# Patient Record
Sex: MALE | Race: BLACK OR AFRICAN AMERICAN | NOT HISPANIC OR LATINO | Employment: FULL TIME | URBAN - METROPOLITAN AREA
[De-identification: names, ages, dates, MRNs, and addresses within clinical notes are randomized per-mention and may not be internally consistent; named-entity substitution may affect disease eponyms.]

---

## 2021-04-20 ENCOUNTER — HOSPITAL ENCOUNTER (OUTPATIENT)
Dept: LAB | Facility: MEDICAL CENTER | Age: 51
End: 2021-04-20
Attending: PHYSICIAN ASSISTANT
Payer: MEDICAID

## 2021-04-20 ENCOUNTER — OFFICE VISIT (OUTPATIENT)
Dept: URGENT CARE | Facility: CLINIC | Age: 51
End: 2021-04-20
Payer: MEDICAID

## 2021-04-20 ENCOUNTER — HOSPITAL ENCOUNTER (OUTPATIENT)
Dept: RADIOLOGY | Facility: MEDICAL CENTER | Age: 51
End: 2021-04-20
Attending: PHYSICIAN ASSISTANT
Payer: MEDICAID

## 2021-04-20 VITALS
WEIGHT: 141 LBS | OXYGEN SATURATION: 95 % | HEART RATE: 75 BPM | TEMPERATURE: 97.5 F | BODY MASS INDEX: 21.44 KG/M2 | SYSTOLIC BLOOD PRESSURE: 116 MMHG | DIASTOLIC BLOOD PRESSURE: 80 MMHG | RESPIRATION RATE: 16 BRPM

## 2021-04-20 DIAGNOSIS — R10.32 LEFT LOWER QUADRANT ABDOMINAL PAIN: ICD-10-CM

## 2021-04-20 LAB
ALBUMIN SERPL BCP-MCNC: 4.5 G/DL (ref 3.2–4.9)
ALBUMIN/GLOB SERPL: 1.6 G/DL
ALP SERPL-CCNC: 73 U/L (ref 30–99)
ALT SERPL-CCNC: 12 U/L (ref 2–50)
ANION GAP SERPL CALC-SCNC: 8 MMOL/L (ref 7–16)
AST SERPL-CCNC: 16 U/L (ref 12–45)
BASOPHILS # BLD AUTO: 0.8 % (ref 0–1.8)
BASOPHILS # BLD: 0.04 K/UL (ref 0–0.12)
BILIRUB SERPL-MCNC: 1.6 MG/DL (ref 0.1–1.5)
BUN SERPL-MCNC: 9 MG/DL (ref 8–22)
CALCIUM SERPL-MCNC: 9.6 MG/DL (ref 8.5–10.5)
CHLORIDE SERPL-SCNC: 105 MMOL/L (ref 96–112)
CO2 SERPL-SCNC: 28 MMOL/L (ref 20–33)
CREAT SERPL-MCNC: 0.89 MG/DL (ref 0.5–1.4)
EOSINOPHIL # BLD AUTO: 0.13 K/UL (ref 0–0.51)
EOSINOPHIL NFR BLD: 2.5 % (ref 0–6.9)
ERYTHROCYTE [DISTWIDTH] IN BLOOD BY AUTOMATED COUNT: 46.2 FL (ref 35.9–50)
GLOBULIN SER CALC-MCNC: 2.9 G/DL (ref 1.9–3.5)
GLUCOSE SERPL-MCNC: 94 MG/DL (ref 65–99)
HCT VFR BLD AUTO: 51.7 % (ref 42–52)
HGB BLD-MCNC: 17.2 G/DL (ref 14–18)
IMM GRANULOCYTES # BLD AUTO: 0.01 K/UL (ref 0–0.11)
IMM GRANULOCYTES NFR BLD AUTO: 0.2 % (ref 0–0.9)
LIPASE SERPL-CCNC: 23 U/L (ref 11–82)
LYMPHOCYTES # BLD AUTO: 2.27 K/UL (ref 1–4.8)
LYMPHOCYTES NFR BLD: 43.5 % (ref 22–41)
MCH RBC QN AUTO: 30 PG (ref 27–33)
MCHC RBC AUTO-ENTMCNC: 33.3 G/DL (ref 33.7–35.3)
MCV RBC AUTO: 90.2 FL (ref 81.4–97.8)
MONOCYTES # BLD AUTO: 0.34 K/UL (ref 0–0.85)
MONOCYTES NFR BLD AUTO: 6.5 % (ref 0–13.4)
NEUTROPHILS # BLD AUTO: 2.43 K/UL (ref 1.82–7.42)
NEUTROPHILS NFR BLD: 46.5 % (ref 44–72)
NRBC # BLD AUTO: 0 K/UL
NRBC BLD-RTO: 0 /100 WBC
PLATELET # BLD AUTO: 179 K/UL (ref 164–446)
PMV BLD AUTO: 11.2 FL (ref 9–12.9)
POTASSIUM SERPL-SCNC: 4.2 MMOL/L (ref 3.6–5.5)
PROT SERPL-MCNC: 7.4 G/DL (ref 6–8.2)
RBC # BLD AUTO: 5.73 M/UL (ref 4.7–6.1)
SODIUM SERPL-SCNC: 141 MMOL/L (ref 135–145)
WBC # BLD AUTO: 5.2 K/UL (ref 4.8–10.8)

## 2021-04-20 PROCEDURE — 83690 ASSAY OF LIPASE: CPT

## 2021-04-20 PROCEDURE — 85025 COMPLETE CBC W/AUTO DIFF WBC: CPT

## 2021-04-20 PROCEDURE — 700117 HCHG RX CONTRAST REV CODE 255: Performed by: PHYSICIAN ASSISTANT

## 2021-04-20 PROCEDURE — 36415 COLL VENOUS BLD VENIPUNCTURE: CPT

## 2021-04-20 PROCEDURE — 80053 COMPREHEN METABOLIC PANEL: CPT

## 2021-04-20 PROCEDURE — 99204 OFFICE O/P NEW MOD 45 MIN: CPT | Performed by: PHYSICIAN ASSISTANT

## 2021-04-20 PROCEDURE — 74177 CT ABD & PELVIS W/CONTRAST: CPT

## 2021-04-20 RX ADMIN — IOHEXOL 25 ML: 240 INJECTION, SOLUTION INTRATHECAL; INTRAVASCULAR; INTRAVENOUS; ORAL at 15:08

## 2021-04-20 RX ADMIN — IOHEXOL 100 ML: 350 INJECTION, SOLUTION INTRAVENOUS at 16:24

## 2021-04-21 ASSESSMENT — ENCOUNTER SYMPTOMS
ABDOMINAL PAIN: 1
SHORTNESS OF BREATH: 0
CONSTIPATION: 0
DIARRHEA: 0
BLOOD IN STOOL: 0
MUSCULOSKELETAL NEGATIVE: 1
FEVER: 0
VOMITING: 0
FLATUS: 0
CHILLS: 0
NAUSEA: 0
DIZZINESS: 0
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
## 2022-08-26 ENCOUNTER — HOSPITAL ENCOUNTER (EMERGENCY)
Facility: MEDICAL CENTER | Age: 52
End: 2022-08-26
Attending: STUDENT IN AN ORGANIZED HEALTH CARE EDUCATION/TRAINING PROGRAM
Payer: MEDICAID

## 2022-08-26 ENCOUNTER — APPOINTMENT (OUTPATIENT)
Dept: RADIOLOGY | Facility: MEDICAL CENTER | Age: 52
End: 2022-08-26
Attending: STUDENT IN AN ORGANIZED HEALTH CARE EDUCATION/TRAINING PROGRAM
Payer: MEDICAID

## 2022-08-26 VITALS
BODY MASS INDEX: 21.78 KG/M2 | TEMPERATURE: 98.1 F | OXYGEN SATURATION: 98 % | HEART RATE: 61 BPM | HEIGHT: 68 IN | RESPIRATION RATE: 18 BRPM | SYSTOLIC BLOOD PRESSURE: 116 MMHG | WEIGHT: 143.74 LBS | DIASTOLIC BLOOD PRESSURE: 75 MMHG

## 2022-08-26 DIAGNOSIS — M75.01 ADHESIVE CAPSULITIS OF RIGHT SHOULDER: ICD-10-CM

## 2022-08-26 DIAGNOSIS — M25.511 CHRONIC RIGHT SHOULDER PAIN: ICD-10-CM

## 2022-08-26 DIAGNOSIS — G89.29 CHRONIC RIGHT SHOULDER PAIN: ICD-10-CM

## 2022-08-26 LAB — EKG IMPRESSION: NORMAL

## 2022-08-26 PROCEDURE — 71045 X-RAY EXAM CHEST 1 VIEW: CPT

## 2022-08-26 PROCEDURE — 700102 HCHG RX REV CODE 250 W/ 637 OVERRIDE(OP): Performed by: STUDENT IN AN ORGANIZED HEALTH CARE EDUCATION/TRAINING PROGRAM

## 2022-08-26 PROCEDURE — 93005 ELECTROCARDIOGRAM TRACING: CPT | Performed by: STUDENT IN AN ORGANIZED HEALTH CARE EDUCATION/TRAINING PROGRAM

## 2022-08-26 PROCEDURE — A9270 NON-COVERED ITEM OR SERVICE: HCPCS | Performed by: STUDENT IN AN ORGANIZED HEALTH CARE EDUCATION/TRAINING PROGRAM

## 2022-08-26 PROCEDURE — 99283 EMERGENCY DEPT VISIT LOW MDM: CPT

## 2022-08-26 PROCEDURE — 73030 X-RAY EXAM OF SHOULDER: CPT | Mod: RT

## 2022-08-26 RX ORDER — METHOCARBAMOL 750 MG/1
750 TABLET, FILM COATED ORAL 4 TIMES DAILY
Qty: 120 TABLET | Refills: 0 | Status: SHIPPED | OUTPATIENT
Start: 2022-08-26 | End: 2023-04-02

## 2022-08-26 RX ORDER — METHOCARBAMOL 500 MG/1
500 TABLET, FILM COATED ORAL ONCE
Status: COMPLETED | OUTPATIENT
Start: 2022-08-26 | End: 2022-08-26

## 2022-08-26 RX ADMIN — METHOCARBAMOL 500 MG: 500 TABLET ORAL at 02:38

## 2022-08-26 ASSESSMENT — FIBROSIS 4 INDEX: FIB4 SCORE: 1.29

## 2022-08-26 NOTE — DISCHARGE INSTRUCTIONS
It is very important that you follow-up with your primary doctor in order to further evaluate your injury with an MRI if needed.  Also very important that you follow through with physical therapy as previously scheduled.  Please return if you develop any worsening pain, numbness or tingling will have any new or acute concern.    For pain, take Ibuprofen (Motrin, Advil) 600 mg up to every six hours  mg up to every eight hours if your stomach can take it. Instead of Ibuprofen, you can choose Naproxen (Aleve) and take 250-500 mg twice daily. Take Ibuprofen or Naproxen with food to lessen stomach irritation. You may also take Acetaminophen (Tylenol) 500mg (may take up to 1gm) every six hours in addition to Ibuprofen or Naproxen. (Maximum Tylenol 4 gm/day).  I suggest alternating between the ibuprofen and Tylenol every 4 hours for optimal pain relief and adequate spacing of each medication.     Please do not drink, drive or operate any heavy machinery with the muscle relaxant prescribed.

## 2022-08-26 NOTE — ED TRIAGE NOTES
"Chief Complaint   Patient presents with    Shoulder Pain     Pt fell and hit right side of body on metal railing 3 months ago. Seen at [facility] previously and given PT orders. Pt states he missed PT appointment. Complaining of right shoulder pain radiating to upper chest and down arm. Pt has decreased ROM in right arm, unable to lift arm above waist.        53 yo M to triage for above complaint. XRAY ordered.     Pt placed in lobby and educated on triage process.     /87   Pulse 78   Temp 36.7 °C (98.1 °F) (Temporal)   Resp 18   Ht 1.727 m (5' 8\")   Wt 65.2 kg (143 lb 11.8 oz)   SpO2 99%   BMI 21.86 kg/m²     "

## 2022-08-26 NOTE — ED PROVIDER NOTES
ED Provider Note    CHIEF COMPLAINT  Chief Complaint   Patient presents with    Shoulder Pain     Pt fell and hit right side of body on metal railing 3 months ago. Seen at [facility] previously and given PT orders. Pt states he missed PT appointment. Complaining of right shoulder pain radiating to upper chest and down arm. Pt has decreased ROM in right arm, unable to lift arm above waist.        HPI  Tariq Rivera is a 52 y.o. male who presents for right shoulder pain.  Patient states that he fell about 3 months ago onto the right side hitting a metal body.  He was seen at [facility] and told he had a negative x-ray.  He says that he has developed worsening pain since then it is constant and radiates through the chest and down the arm.  Has he has difficulty with range of motion.  No numbness or tingling.  He states pain is constant, severe, not relieved with ibuprofen or Tylenol.  He denies any repeat injury or traumatic injury.    REVIEW OF SYSTEMS  As per \[facility]\"", otherwise a 5 point review of systems is negative    PAST MEDICAL HISTORY  Past Medical History:   Diagnosis Date    Asthma     Hemorrhoids, external 11/11/2013       SOCIAL HISTORY  Social History     Tobacco Use    Smoking status: Every Day     Packs/day: 0.50     Types: Cigarettes    Smokeless tobacco: Never   Vaping Use    Vaping Use: Never used   Substance Use Topics    Alcohol use: Yes    Drug use: Yes     Types: Inhaled     Comment: marijuana       SURGICAL HISTORY  Past Surgical History:   Procedure Laterality Date    ARCH BAR APPLICATION N/A 9/6/2015    Procedure: ARCH BAR APPLICATION;  Surgeon: Yashira Gordon M.D.;  Location: SURGERY [facility];  Service:     LACERATION REPAIR N/A 9/6/2015    Procedure: LACERATION REPAIR;  Surgeon: Yashira Gordon M.D.;  Location: SURGERY [facility];  Service:        ALLERGIES  Allergies   Allergen Reactions    Nkda [No Known Drug Allergy]        PHYSICAL EXAM  VITAL SIGNS: /75   Pulse 61    "Temp 36.7 °C (98.1 °F) (Temporal)   Resp 18   Ht 1.727 m (5' 8\")   Wt 65.2 kg (143 lb 11.8 oz)   SpO2 98%   BMI 21.86 kg/m²    Constitutional: Awake and alert. Nontoxic  HENT:  Grossly normal  Eyes: Grossly normal  Neck: Normal range of motion  Cardiovascular: Normal heart rate   Thorax & Lungs: No respiratory distress  Abdomen: Nontender  Skin:  No pathologic rash.   Extremities:   Shoulder Exam  No obvious deformity.  No erythema, edema, ecchymosis, or broken skin. Limited ROM, unable to abduct past 90 degrees.  No point tenderness of clavicle, glenohumeral joint line or AC joint. 2+ radial pulses bilaterally. Sensation intact in axillary nerve distribution and radial, median and ulnar nerve distributions distally. 5/5 strength with shoulder abduction, elbow flexion/extension, wrist flexion/extension.   Psychiatric: Affect normal    RADIOLOGY/PROCEDURES  Imaging is interpreted by radiologist    Labs:  Laboratory data ordered and reviewed, please see chart    EKG  Rate: Normal  Rhythm: Normal  Axis:Normal  Intervals:Normal  ST changes: None  Interpretation:'Normal EKG    COURSE & MEDICAL DECISION MAKING      This is a 52-year-old with a remote right shoulder injury who presents with persistent right shoulder pain.  X-ray does not reveal any acute fracture.  Given his complaints of radiation to the chest I obtained a chest x-ray and an EKG in an abundance of caution.  He did not have any acute cardiopulmonary abnormalities including no rib fractures, pneumothorax or pneumonia.  EKG is all additionally normal and he has no other signs to suggest ACS, myocarditis or pericarditis.  He does have significant limitation in range of motion of his affected shoulder and I am worried he has developed a frozen shoulder.  I stressed the importance of outpatient follow-up, physical therapy and he is agreeable.  He was given methocarbamol in the ER and prescription sent to his preferred pharmacy.        FINAL IMPRESSION  1. " Chronic right shoulder pain  methocarbamol (ROBAXIN) 750 MG Tab      2. Adhesive capsulitis of right shoulder Acute               Disposition: home in good condition      This dictation was created using voice recognition software. The accuracy of the dictation is limited to the abilities of the software.  The nursing notes were reviewed and certain aspects of this information were incorporated into this note.      Electronically signed by: Rhea Watson M.D., 8/26/2022 2:27 AM

## 2023-04-02 ENCOUNTER — APPOINTMENT (OUTPATIENT)
Dept: RADIOLOGY | Facility: MEDICAL CENTER | Age: 53
DRG: 337 | End: 2023-04-02
Attending: EMERGENCY MEDICINE
Payer: MEDICAID

## 2023-04-02 ENCOUNTER — HOSPITAL ENCOUNTER (INPATIENT)
Facility: MEDICAL CENTER | Age: 53
LOS: 4 days | DRG: 337 | End: 2023-04-06
Attending: EMERGENCY MEDICINE | Admitting: SURGERY
Payer: MEDICAID

## 2023-04-02 ENCOUNTER — ANESTHESIA (OUTPATIENT)
Dept: SURGERY | Facility: MEDICAL CENTER | Age: 53
DRG: 337 | End: 2023-04-02
Payer: MEDICAID

## 2023-04-02 ENCOUNTER — ANESTHESIA EVENT (OUTPATIENT)
Dept: SURGERY | Facility: MEDICAL CENTER | Age: 53
DRG: 337 | End: 2023-04-02
Payer: MEDICAID

## 2023-04-02 DIAGNOSIS — K56.609 SMALL BOWEL OBSTRUCTION (HCC): ICD-10-CM

## 2023-04-02 LAB
ALBUMIN SERPL BCP-MCNC: 4.3 G/DL (ref 3.2–4.9)
ALBUMIN/GLOB SERPL: 1.3 G/DL
ALP SERPL-CCNC: 75 U/L (ref 30–99)
ALT SERPL-CCNC: 11 U/L (ref 2–50)
AMPHET UR QL SCN: NEGATIVE
ANION GAP SERPL CALC-SCNC: 13 MMOL/L (ref 7–16)
APPEARANCE UR: CLEAR
AST SERPL-CCNC: 20 U/L (ref 12–45)
BARBITURATES UR QL SCN: NEGATIVE
BASOPHILS # BLD AUTO: 0.5 % (ref 0–1.8)
BASOPHILS # BLD: 0.04 K/UL (ref 0–0.12)
BENZODIAZ UR QL SCN: NEGATIVE
BILIRUB SERPL-MCNC: 0.8 MG/DL (ref 0.1–1.5)
BILIRUB UR QL STRIP.AUTO: NEGATIVE
BUN SERPL-MCNC: 8 MG/DL (ref 8–22)
BZE UR QL SCN: NEGATIVE
CALCIUM ALBUM COR SERPL-MCNC: 9 MG/DL (ref 8.5–10.5)
CALCIUM SERPL-MCNC: 9.2 MG/DL (ref 8.5–10.5)
CANNABINOIDS UR QL SCN: POSITIVE
CHLORIDE SERPL-SCNC: 108 MMOL/L (ref 96–112)
CO2 SERPL-SCNC: 21 MMOL/L (ref 20–33)
COLOR UR: YELLOW
CREAT SERPL-MCNC: 0.85 MG/DL (ref 0.5–1.4)
EOSINOPHIL # BLD AUTO: 0.15 K/UL (ref 0–0.51)
EOSINOPHIL NFR BLD: 1.9 % (ref 0–6.9)
ERYTHROCYTE [DISTWIDTH] IN BLOOD BY AUTOMATED COUNT: 43 FL (ref 35.9–50)
GFR SERPLBLD CREATININE-BSD FMLA CKD-EPI: 105 ML/MIN/1.73 M 2
GLOBULIN SER CALC-MCNC: 3.2 G/DL (ref 1.9–3.5)
GLUCOSE SERPL-MCNC: 89 MG/DL (ref 65–99)
GLUCOSE UR STRIP.AUTO-MCNC: NEGATIVE MG/DL
HCT VFR BLD AUTO: 47.9 % (ref 42–52)
HGB BLD-MCNC: 16.4 G/DL (ref 14–18)
IMM GRANULOCYTES # BLD AUTO: 0.02 K/UL (ref 0–0.11)
IMM GRANULOCYTES NFR BLD AUTO: 0.3 % (ref 0–0.9)
KETONES UR STRIP.AUTO-MCNC: NEGATIVE MG/DL
LEUKOCYTE ESTERASE UR QL STRIP.AUTO: NEGATIVE
LIPASE SERPL-CCNC: 20 U/L (ref 11–82)
LYMPHOCYTES # BLD AUTO: 2.24 K/UL (ref 1–4.8)
LYMPHOCYTES NFR BLD: 28.4 % (ref 22–41)
MCH RBC QN AUTO: 29.4 PG (ref 27–33)
MCHC RBC AUTO-ENTMCNC: 34.2 G/DL (ref 33.7–35.3)
MCV RBC AUTO: 86 FL (ref 81.4–97.8)
METHADONE UR QL SCN: NEGATIVE
MICRO URNS: NORMAL
MONOCYTES # BLD AUTO: 0.4 K/UL (ref 0–0.85)
MONOCYTES NFR BLD AUTO: 5.1 % (ref 0–13.4)
NEUTROPHILS # BLD AUTO: 5.04 K/UL (ref 1.82–7.42)
NEUTROPHILS NFR BLD: 63.8 % (ref 44–72)
NITRITE UR QL STRIP.AUTO: NEGATIVE
NRBC # BLD AUTO: 0 K/UL
NRBC BLD-RTO: 0 /100 WBC
OPIATES UR QL SCN: POSITIVE
OXYCODONE UR QL SCN: NEGATIVE
PCP UR QL SCN: NEGATIVE
PH UR STRIP.AUTO: 8 [PH] (ref 5–8)
PLATELET # BLD AUTO: 203 K/UL (ref 164–446)
PMV BLD AUTO: 11.2 FL (ref 9–12.9)
POTASSIUM SERPL-SCNC: 3.8 MMOL/L (ref 3.6–5.5)
PROPOXYPH UR QL SCN: NEGATIVE
PROT SERPL-MCNC: 7.5 G/DL (ref 6–8.2)
PROT UR QL STRIP: NEGATIVE MG/DL
RBC # BLD AUTO: 5.57 M/UL (ref 4.7–6.1)
RBC UR QL AUTO: NEGATIVE
SODIUM SERPL-SCNC: 142 MMOL/L (ref 135–145)
SP GR UR STRIP.AUTO: 1.04
UROBILINOGEN UR STRIP.AUTO-MCNC: 1 MG/DL
WBC # BLD AUTO: 7.9 K/UL (ref 4.8–10.8)

## 2023-04-02 PROCEDURE — 96375 TX/PRO/DX INJ NEW DRUG ADDON: CPT

## 2023-04-02 PROCEDURE — 3E0T3BZ INTRODUCTION OF ANESTHETIC AGENT INTO PERIPHERAL NERVES AND PLEXI, PERCUTANEOUS APPROACH: ICD-10-PCS | Performed by: ANESTHESIOLOGY

## 2023-04-02 PROCEDURE — 64488 TAP BLOCK BI INJECTION: CPT | Performed by: SURGERY

## 2023-04-02 PROCEDURE — 80053 COMPREHEN METABOLIC PANEL: CPT

## 2023-04-02 PROCEDURE — 160002 HCHG RECOVERY MINUTES (STAT): Performed by: SURGERY

## 2023-04-02 PROCEDURE — 700111 HCHG RX REV CODE 636 W/ 250 OVERRIDE (IP): Performed by: ANESTHESIOLOGY

## 2023-04-02 PROCEDURE — 83690 ASSAY OF LIPASE: CPT

## 2023-04-02 PROCEDURE — 700105 HCHG RX REV CODE 258: Performed by: ANESTHESIOLOGY

## 2023-04-02 PROCEDURE — 85025 COMPLETE CBC W/AUTO DIFF WBC: CPT

## 2023-04-02 PROCEDURE — 99291 CRITICAL CARE FIRST HOUR: CPT

## 2023-04-02 PROCEDURE — 160048 HCHG OR STATISTICAL LEVEL 1-5: Performed by: SURGERY

## 2023-04-02 PROCEDURE — 74177 CT ABD & PELVIS W/CONTRAST: CPT

## 2023-04-02 PROCEDURE — 64488 TAP BLOCK BI INJECTION: CPT | Mod: 59 | Performed by: ANESTHESIOLOGY

## 2023-04-02 PROCEDURE — 96374 THER/PROPH/DIAG INJ IV PUSH: CPT

## 2023-04-02 PROCEDURE — 44005 FREEING OF BOWEL ADHESION: CPT | Mod: AS | Performed by: PHYSICIAN ASSISTANT

## 2023-04-02 PROCEDURE — 160042 HCHG SURGERY MINUTES - EA ADDL 1 MIN LEVEL 5: Performed by: SURGERY

## 2023-04-02 PROCEDURE — 700101 HCHG RX REV CODE 250

## 2023-04-02 PROCEDURE — 700101 HCHG RX REV CODE 250: Performed by: ANESTHESIOLOGY

## 2023-04-02 PROCEDURE — 700105 HCHG RX REV CODE 258: Performed by: SURGERY

## 2023-04-02 PROCEDURE — 00790 ANES IPER UPR ABD NOS: CPT | Performed by: ANESTHESIOLOGY

## 2023-04-02 PROCEDURE — 160031 HCHG SURGERY MINUTES - 1ST 30 MINS LEVEL 5: Performed by: SURGERY

## 2023-04-02 PROCEDURE — 70450 CT HEAD/BRAIN W/O DYE: CPT

## 2023-04-02 PROCEDURE — 80307 DRUG TEST PRSMV CHEM ANLYZR: CPT

## 2023-04-02 PROCEDURE — 770001 HCHG ROOM/CARE - MED/SURG/GYN PRIV*

## 2023-04-02 PROCEDURE — 160035 HCHG PACU - 1ST 60 MINS PHASE I: Performed by: SURGERY

## 2023-04-02 PROCEDURE — 99223 1ST HOSP IP/OBS HIGH 75: CPT | Mod: 57 | Performed by: SURGERY

## 2023-04-02 PROCEDURE — 700105 HCHG RX REV CODE 258: Performed by: EMERGENCY MEDICINE

## 2023-04-02 PROCEDURE — 81003 URINALYSIS AUTO W/O SCOPE: CPT

## 2023-04-02 PROCEDURE — 700111 HCHG RX REV CODE 636 W/ 250 OVERRIDE (IP): Performed by: EMERGENCY MEDICINE

## 2023-04-02 PROCEDURE — 160036 HCHG PACU - EA ADDL 30 MINS PHASE I: Performed by: SURGERY

## 2023-04-02 PROCEDURE — 0DNW0ZZ RELEASE PERITONEUM, OPEN APPROACH: ICD-10-PCS | Performed by: SURGERY

## 2023-04-02 PROCEDURE — 160009 HCHG ANES TIME/MIN: Performed by: SURGERY

## 2023-04-02 PROCEDURE — 36415 COLL VENOUS BLD VENIPUNCTURE: CPT

## 2023-04-02 PROCEDURE — 700101 HCHG RX REV CODE 250: Performed by: EMERGENCY MEDICINE

## 2023-04-02 PROCEDURE — 700117 HCHG RX CONTRAST REV CODE 255: Performed by: EMERGENCY MEDICINE

## 2023-04-02 PROCEDURE — 700111 HCHG RX REV CODE 636 W/ 250 OVERRIDE (IP): Performed by: SURGERY

## 2023-04-02 PROCEDURE — 44005 FREEING OF BOWEL ADHESION: CPT | Performed by: SURGERY

## 2023-04-02 RX ORDER — SODIUM CHLORIDE, SODIUM LACTATE, POTASSIUM CHLORIDE, CALCIUM CHLORIDE 600; 310; 30; 20 MG/100ML; MG/100ML; MG/100ML; MG/100ML
INJECTION, SOLUTION INTRAVENOUS CONTINUOUS
Status: DISCONTINUED | OUTPATIENT
Start: 2023-04-02 | End: 2023-04-04

## 2023-04-02 RX ORDER — DIPHENHYDRAMINE HYDROCHLORIDE 50 MG/ML
12.5 INJECTION INTRAMUSCULAR; INTRAVENOUS
Status: DISCONTINUED | OUTPATIENT
Start: 2023-04-02 | End: 2023-04-02 | Stop reason: HOSPADM

## 2023-04-02 RX ORDER — BUPIVACAINE HYDROCHLORIDE 2.5 MG/ML
INJECTION, SOLUTION EPIDURAL; INFILTRATION; INTRACAUDAL
Status: COMPLETED | OUTPATIENT
Start: 2023-04-02 | End: 2023-04-02

## 2023-04-02 RX ORDER — HYDROMORPHONE HYDROCHLORIDE 1 MG/ML
1 INJECTION, SOLUTION INTRAMUSCULAR; INTRAVENOUS; SUBCUTANEOUS ONCE
Status: COMPLETED | OUTPATIENT
Start: 2023-04-02 | End: 2023-04-02

## 2023-04-02 RX ORDER — METOPROLOL TARTRATE 1 MG/ML
1 INJECTION, SOLUTION INTRAVENOUS
Status: DISCONTINUED | OUTPATIENT
Start: 2023-04-02 | End: 2023-04-02 | Stop reason: HOSPADM

## 2023-04-02 RX ORDER — METRONIDAZOLE 500 MG/100ML
500 INJECTION, SOLUTION INTRAVENOUS ONCE
Status: COMPLETED | OUTPATIENT
Start: 2023-04-02 | End: 2023-04-02

## 2023-04-02 RX ORDER — CEFTRIAXONE 1 G/1
1000 INJECTION, POWDER, FOR SOLUTION INTRAMUSCULAR; INTRAVENOUS ONCE
Status: COMPLETED | OUTPATIENT
Start: 2023-04-02 | End: 2023-04-02

## 2023-04-02 RX ORDER — HYDROMORPHONE HYDROCHLORIDE 1 MG/ML
0.4 INJECTION, SOLUTION INTRAMUSCULAR; INTRAVENOUS; SUBCUTANEOUS
Status: DISCONTINUED | OUTPATIENT
Start: 2023-04-02 | End: 2023-04-02 | Stop reason: HOSPADM

## 2023-04-02 RX ORDER — SODIUM CHLORIDE 9 MG/ML
1000 INJECTION, SOLUTION INTRAVENOUS ONCE
Status: COMPLETED | OUTPATIENT
Start: 2023-04-02 | End: 2023-04-03

## 2023-04-02 RX ORDER — DEXAMETHASONE SODIUM PHOSPHATE 4 MG/ML
INJECTION, SOLUTION INTRA-ARTICULAR; INTRALESIONAL; INTRAMUSCULAR; INTRAVENOUS; SOFT TISSUE PRN
Status: DISCONTINUED | OUTPATIENT
Start: 2023-04-02 | End: 2023-04-02 | Stop reason: SURG

## 2023-04-02 RX ORDER — LORAZEPAM 2 MG/ML
2 INJECTION INTRAMUSCULAR ONCE
Status: DISPENSED | OUTPATIENT
Start: 2023-04-02 | End: 2023-04-03

## 2023-04-02 RX ORDER — EPHEDRINE SULFATE 50 MG/ML
5 INJECTION, SOLUTION INTRAVENOUS
Status: DISCONTINUED | OUTPATIENT
Start: 2023-04-02 | End: 2023-04-02 | Stop reason: HOSPADM

## 2023-04-02 RX ORDER — OXYCODONE HCL 5 MG/5 ML
10 SOLUTION, ORAL ORAL
Status: DISCONTINUED | OUTPATIENT
Start: 2023-04-02 | End: 2023-04-02 | Stop reason: HOSPADM

## 2023-04-02 RX ORDER — HYDROMORPHONE HYDROCHLORIDE 2 MG/ML
INJECTION, SOLUTION INTRAMUSCULAR; INTRAVENOUS; SUBCUTANEOUS PRN
Status: DISCONTINUED | OUTPATIENT
Start: 2023-04-02 | End: 2023-04-02 | Stop reason: SURG

## 2023-04-02 RX ORDER — MIDAZOLAM HYDROCHLORIDE 1 MG/ML
INJECTION INTRAMUSCULAR; INTRAVENOUS PRN
Status: DISCONTINUED | OUTPATIENT
Start: 2023-04-02 | End: 2023-04-02 | Stop reason: SURG

## 2023-04-02 RX ORDER — ACETAMINOPHEN 500 MG
1000 TABLET ORAL EVERY 4 HOURS PRN
Status: DISCONTINUED | OUTPATIENT
Start: 2023-04-02 | End: 2023-04-02 | Stop reason: HOSPADM

## 2023-04-02 RX ORDER — HYDROMORPHONE HYDROCHLORIDE 1 MG/ML
0.2 INJECTION, SOLUTION INTRAMUSCULAR; INTRAVENOUS; SUBCUTANEOUS
Status: DISCONTINUED | OUTPATIENT
Start: 2023-04-02 | End: 2023-04-02 | Stop reason: HOSPADM

## 2023-04-02 RX ORDER — ONDANSETRON 2 MG/ML
INJECTION INTRAMUSCULAR; INTRAVENOUS PRN
Status: DISCONTINUED | OUTPATIENT
Start: 2023-04-02 | End: 2023-04-02 | Stop reason: SURG

## 2023-04-02 RX ORDER — ONDANSETRON 2 MG/ML
4 INJECTION INTRAMUSCULAR; INTRAVENOUS EVERY 4 HOURS PRN
Status: DISCONTINUED | OUTPATIENT
Start: 2023-04-02 | End: 2023-04-06 | Stop reason: HOSPADM

## 2023-04-02 RX ORDER — ONDANSETRON 2 MG/ML
4 INJECTION INTRAMUSCULAR; INTRAVENOUS ONCE
Status: COMPLETED | OUTPATIENT
Start: 2023-04-02 | End: 2023-04-02

## 2023-04-02 RX ORDER — HALOPERIDOL 5 MG/ML
1 INJECTION INTRAMUSCULAR
Status: DISCONTINUED | OUTPATIENT
Start: 2023-04-02 | End: 2023-04-02 | Stop reason: HOSPADM

## 2023-04-02 RX ORDER — ALBUTEROL SULFATE 2.5 MG/3ML
2.5 SOLUTION RESPIRATORY (INHALATION)
Status: DISCONTINUED | OUTPATIENT
Start: 2023-04-02 | End: 2023-04-02 | Stop reason: HOSPADM

## 2023-04-02 RX ORDER — ROCURONIUM BROMIDE 10 MG/ML
INJECTION, SOLUTION INTRAVENOUS PRN
Status: DISCONTINUED | OUTPATIENT
Start: 2023-04-02 | End: 2023-04-02 | Stop reason: HOSPADM

## 2023-04-02 RX ORDER — KETOROLAC TROMETHAMINE 30 MG/ML
INJECTION, SOLUTION INTRAMUSCULAR; INTRAVENOUS PRN
Status: DISCONTINUED | OUTPATIENT
Start: 2023-04-02 | End: 2023-04-02 | Stop reason: SURG

## 2023-04-02 RX ORDER — MORPHINE SULFATE 4 MG/ML
4 INJECTION INTRAVENOUS ONCE
Status: COMPLETED | OUTPATIENT
Start: 2023-04-02 | End: 2023-04-02

## 2023-04-02 RX ORDER — ONDANSETRON 2 MG/ML
4 INJECTION INTRAMUSCULAR; INTRAVENOUS
Status: DISCONTINUED | OUTPATIENT
Start: 2023-04-02 | End: 2023-04-02 | Stop reason: HOSPADM

## 2023-04-02 RX ORDER — HYDROMORPHONE HYDROCHLORIDE 1 MG/ML
0.1 INJECTION, SOLUTION INTRAMUSCULAR; INTRAVENOUS; SUBCUTANEOUS
Status: DISCONTINUED | OUTPATIENT
Start: 2023-04-02 | End: 2023-04-02 | Stop reason: HOSPADM

## 2023-04-02 RX ORDER — SODIUM CHLORIDE, SODIUM LACTATE, POTASSIUM CHLORIDE, CALCIUM CHLORIDE 600; 310; 30; 20 MG/100ML; MG/100ML; MG/100ML; MG/100ML
INJECTION, SOLUTION INTRAVENOUS CONTINUOUS
Status: DISCONTINUED | OUTPATIENT
Start: 2023-04-02 | End: 2023-04-02 | Stop reason: HOSPADM

## 2023-04-02 RX ORDER — SODIUM CHLORIDE, SODIUM LACTATE, POTASSIUM CHLORIDE, CALCIUM CHLORIDE 600; 310; 30; 20 MG/100ML; MG/100ML; MG/100ML; MG/100ML
INJECTION, SOLUTION INTRAVENOUS
Status: DISCONTINUED | OUTPATIENT
Start: 2023-04-02 | End: 2023-04-02 | Stop reason: SURG

## 2023-04-02 RX ORDER — LABETALOL HYDROCHLORIDE 5 MG/ML
5 INJECTION, SOLUTION INTRAVENOUS
Status: DISCONTINUED | OUTPATIENT
Start: 2023-04-02 | End: 2023-04-02 | Stop reason: HOSPADM

## 2023-04-02 RX ORDER — OXYCODONE HCL 5 MG/5 ML
5 SOLUTION, ORAL ORAL
Status: DISCONTINUED | OUTPATIENT
Start: 2023-04-02 | End: 2023-04-02 | Stop reason: HOSPADM

## 2023-04-02 RX ORDER — LORAZEPAM 2 MG/ML
0.5 INJECTION INTRAMUSCULAR ONCE
Status: DISCONTINUED | OUTPATIENT
Start: 2023-04-02 | End: 2023-04-02 | Stop reason: SDUPTHER

## 2023-04-02 RX ORDER — MEPERIDINE HYDROCHLORIDE 25 MG/ML
12.5 INJECTION INTRAMUSCULAR; INTRAVENOUS; SUBCUTANEOUS
Status: DISCONTINUED | OUTPATIENT
Start: 2023-04-02 | End: 2023-04-02 | Stop reason: HOSPADM

## 2023-04-02 RX ORDER — LABETALOL HYDROCHLORIDE 5 MG/ML
INJECTION, SOLUTION INTRAVENOUS PRN
Status: DISCONTINUED | OUTPATIENT
Start: 2023-04-02 | End: 2023-04-02 | Stop reason: SURG

## 2023-04-02 RX ORDER — ONDANSETRON 4 MG/1
4 TABLET, ORALLY DISINTEGRATING ORAL EVERY 4 HOURS PRN
Status: DISCONTINUED | OUTPATIENT
Start: 2023-04-02 | End: 2023-04-06 | Stop reason: HOSPADM

## 2023-04-02 RX ORDER — CEFOTETAN DISODIUM 2 G/20ML
INJECTION, POWDER, FOR SOLUTION INTRAMUSCULAR; INTRAVENOUS PRN
Status: DISCONTINUED | OUTPATIENT
Start: 2023-04-02 | End: 2023-04-02 | Stop reason: HOSPADM

## 2023-04-02 RX ORDER — MIDAZOLAM HYDROCHLORIDE 1 MG/ML
1 INJECTION INTRAMUSCULAR; INTRAVENOUS
Status: DISCONTINUED | OUTPATIENT
Start: 2023-04-02 | End: 2023-04-02 | Stop reason: HOSPADM

## 2023-04-02 RX ORDER — ALBUTEROL SULFATE 2.5 MG/3ML
SOLUTION RESPIRATORY (INHALATION)
Status: COMPLETED
Start: 2023-04-02 | End: 2023-04-02

## 2023-04-02 RX ADMIN — MORPHINE SULFATE 4 MG: 4 INJECTION INTRAVENOUS at 08:06

## 2023-04-02 RX ADMIN — PROPOFOL 50 MG: 10 INJECTION, EMULSION INTRAVENOUS at 14:16

## 2023-04-02 RX ADMIN — SODIUM CHLORIDE, POTASSIUM CHLORIDE, SODIUM LACTATE AND CALCIUM CHLORIDE: 600; 310; 30; 20 INJECTION, SOLUTION INTRAVENOUS at 20:46

## 2023-04-02 RX ADMIN — MIDAZOLAM HYDROCHLORIDE 2 MG: 1 INJECTION, SOLUTION INTRAMUSCULAR; INTRAVENOUS at 14:08

## 2023-04-02 RX ADMIN — ROCURONIUM BROMIDE 40 MG: 10 INJECTION, SOLUTION INTRAVENOUS at 14:13

## 2023-04-02 RX ADMIN — PROPOFOL 150 MG: 10 INJECTION, EMULSION INTRAVENOUS at 14:13

## 2023-04-02 RX ADMIN — MEPERIDINE HYDROCHLORIDE 12.5 MG: 25 INJECTION INTRAMUSCULAR; INTRAVENOUS; SUBCUTANEOUS at 15:33

## 2023-04-02 RX ADMIN — BUPIVACAINE HYDROCHLORIDE 25 ML: 2.5 INJECTION, SOLUTION EPIDURAL; INFILTRATION; INTRACAUDAL; PERINEURAL at 13:37

## 2023-04-02 RX ADMIN — Medication: at 20:47

## 2023-04-02 RX ADMIN — CEFTRIAXONE SODIUM 1000 MG: 1 INJECTION, POWDER, FOR SOLUTION INTRAMUSCULAR; INTRAVENOUS at 12:40

## 2023-04-02 RX ADMIN — METRONIDAZOLE 500 MG: 500 INJECTION, SOLUTION INTRAVENOUS at 12:30

## 2023-04-02 RX ADMIN — KETOROLAC TROMETHAMINE 30 MG: 30 INJECTION, SOLUTION INTRAMUSCULAR at 15:08

## 2023-04-02 RX ADMIN — HYDROMORPHONE HYDROCHLORIDE 1 MG: 1 INJECTION, SOLUTION INTRAMUSCULAR; INTRAVENOUS; SUBCUTANEOUS at 12:37

## 2023-04-02 RX ADMIN — HYDROMORPHONE HYDROCHLORIDE 1 MG: 1 INJECTION, SOLUTION INTRAMUSCULAR; INTRAVENOUS; SUBCUTANEOUS at 09:34

## 2023-04-02 RX ADMIN — ONDANSETRON 4 MG: 2 INJECTION INTRAMUSCULAR; INTRAVENOUS at 14:49

## 2023-04-02 RX ADMIN — HYDROMORPHONE HYDROCHLORIDE 2 MG: 2 INJECTION INTRAMUSCULAR; INTRAVENOUS; SUBCUTANEOUS at 14:13

## 2023-04-02 RX ADMIN — SODIUM CHLORIDE 1000 ML: 9 INJECTION, SOLUTION INTRAVENOUS at 09:34

## 2023-04-02 RX ADMIN — ALBUTEROL SULFATE 2.5 MG: 2.5 SOLUTION RESPIRATORY (INHALATION) at 15:56

## 2023-04-02 RX ADMIN — LABETALOL HYDROCHLORIDE 10 MG: 5 INJECTION, SOLUTION INTRAVENOUS at 14:39

## 2023-04-02 RX ADMIN — ONDANSETRON HYDROCHLORIDE 4 MG: 2 SOLUTION INTRAMUSCULAR; INTRAVENOUS at 08:06

## 2023-04-02 RX ADMIN — IOHEXOL 80 ML: 350 INJECTION, SOLUTION INTRAVENOUS at 09:30

## 2023-04-02 RX ADMIN — SODIUM CHLORIDE, POTASSIUM CHLORIDE, SODIUM LACTATE AND CALCIUM CHLORIDE: 600; 310; 30; 20 INJECTION, SOLUTION INTRAVENOUS at 14:03

## 2023-04-02 RX ADMIN — CEFOTETAN DISODIUM 2 G: 2 INJECTION, POWDER, FOR SOLUTION INTRAMUSCULAR; INTRAVENOUS at 14:24

## 2023-04-02 RX ADMIN — MEPERIDINE HYDROCHLORIDE 12.5 MG: 25 INJECTION INTRAMUSCULAR; INTRAVENOUS; SUBCUTANEOUS at 15:40

## 2023-04-02 RX ADMIN — DEXAMETHASONE SODIUM PHOSPHATE 4 MG: 4 INJECTION, SOLUTION INTRA-ARTICULAR; INTRALESIONAL; INTRAMUSCULAR; INTRAVENOUS; SOFT TISSUE at 14:49

## 2023-04-02 RX ADMIN — SUGAMMADEX 200 MG: 100 INJECTION, SOLUTION INTRAVENOUS at 15:23

## 2023-04-02 RX ADMIN — BUPIVACAINE HYDROCHLORIDE 25 ML: 2.5 INJECTION, SOLUTION EPIDURAL; INFILTRATION; INTRACAUDAL; PERINEURAL at 13:35

## 2023-04-02 RX ADMIN — ALBUTEROL SULFATE 2.5 MG: 2.5 SOLUTION RESPIRATORY (INHALATION) at 15:35

## 2023-04-02 ASSESSMENT — PAIN SCALES - GENERAL: PAIN_LEVEL: 0

## 2023-04-02 ASSESSMENT — PATIENT HEALTH QUESTIONNAIRE - PHQ9
SUM OF ALL RESPONSES TO PHQ9 QUESTIONS 1 AND 2: 0
2. FEELING DOWN, DEPRESSED, IRRITABLE, OR HOPELESS: NOT AT ALL
1. LITTLE INTEREST OR PLEASURE IN DOING THINGS: NOT AT ALL

## 2023-04-02 ASSESSMENT — PAIN DESCRIPTION - PAIN TYPE
TYPE: ACUTE PAIN

## 2023-04-02 ASSESSMENT — FIBROSIS 4 INDEX: FIB4 SCORE: 1.29

## 2023-04-02 NOTE — ED NOTES
Pt call light ringing. Upon checking on pt, pt was unresponsive and tense in the arms. O2 sat decreased to 72% on RA. MD to bedside. NRB at 15l/m placed. After approximately 2 mins, pt relaxed but still not answering questions. Ativan held per MD.

## 2023-04-02 NOTE — ED NOTES
Pt still c/o pain 7/10. Pt did get up to bedside commode to attempt to have bm but was unsuccessful. Erp contacted for more pain medication
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
## 2023-04-02 NOTE — ANESTHESIA TIME REPORT
Anesthesia Start and Stop Event Times     Date Time Event    4/2/2023 1346 Ready for Procedure     1403 Anesthesia Start     1528 Anesthesia Stop        Responsible Staff  04/02/23    Name Role Begin End    Bhargav Arnold M.D. Anesth 1403 1528        Overtime Reason:  no overtime (within assigned shift)    Comments:

## 2023-04-02 NOTE — ED NOTES
MD at bedside, POC discussed ith the pt who voices understanding of plans for surgery for bowel obstruction. Pt is GCS 15. VSS.
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
## 2023-04-02 NOTE — ANESTHESIA PROCEDURE NOTES
Airway    Date/Time: 4/2/2023 2:15 PM  Performed by: Bhargav Arnold M.D.  Authorized by: Bhargav Arnold M.D.     Location:  OR  Urgency:  Elective  Indications for Airway Management:  Anesthesia      Spontaneous Ventilation: absent    Sedation Level:  Deep  Preoxygenated: Yes    Patient Position:  Sniffing  Mask Difficulty Assessment:  1 - vent by mask  Final Airway Type:  Endotracheal airway  Final Endotracheal Airway:  ETT  Cuffed: Yes    Technique Used for Successful ETT Placement:  Direct laryngoscopy  Devices/Methods Used in Placement:  Cricoid pressure    Insertion Site:  Oral  Blade Type:  Marilyn  Laryngoscope Blade/Videolaryngoscope Blade Size:  3  ETT Size (mm):  7.0  Measured from:  Teeth  ETT to Teeth (cm):  25  Placement Verified by: auscultation and capnometry    Cormack-Lehane Classification:  Grade IIa - partial view of glottis  Number of Attempts at Approach:  1

## 2023-04-02 NOTE — ANESTHESIA POSTPROCEDURE EVALUATION
Patient: Tariq Rivera    Procedure Summary     Date: 04/02/23 Room / Location: [facility] 09 / SURGERY [facility]    Anesthesia Start: 1403 Anesthesia Stop: 1528    Procedure: LAPAROTOMY, WITH LYSIS OF ADHESIONS (Abdomen) Diagnosis: (closed loop bowel obstruction)    Surgeons: John Abdalla M.D. Responsible Provider: Bhargav Arnold M.D.    Anesthesia Type: general ASA Status: 2          Final Anesthesia Type: general  Last vitals  BP   Blood Pressure: (!) 179/83    Temp   36.7 °C (98.1 °F)    Pulse   83   Resp   18    SpO2   99 %      Anesthesia Post Evaluation    Patient location during evaluation: PACU  Patient participation: complete - patient participated  Level of consciousness: awake and alert  Pain score: 0    Airway patency: patent  Anesthetic complications: no  Cardiovascular status: hemodynamically stable  Respiratory status: acceptable  Hydration status: euvolemic  Comments: Observed in OR post extubation--some stridor.  Resolve.d     PONV: none          No notable events documented.     Nurse Pain Score: 7 (NPRS)

## 2023-04-02 NOTE — LETTER
[facility]  [facility]  [address] [address]  [phone]     April 6, 2023    Patient: Tariq Rivera   YOB: 1970   Date of Visit: 4/2/2023       To Whom It May Concern:    Tariq Rivera was seen and treated in our department on 4/2/2023.     He is unable to partake in any strenuous activity, lift greater than 10 pounds, repetitive bending or twisting until after his follow up appointment on 4/13.      Sincerely,     ZOFIA Ibarra.
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
## 2023-04-02 NOTE — OP REPORT
Surgeon: John Abdalla MD  Assist: Belkis Treviño PA-C  Preoperative diagnosis: High-grade bowel obstruction  Postoperative diagnosis: High-grade bowel obstruction secondary to a closed-loop obstruction  Procedure: Exploratory laparotomy with lysis of adhesions  Anesthesia: General endotracheal anesthesia  Anesthesiologist: Bhargav Arnold MD  Indications: 52-year-old man who presented with fairly significant pain of relatively short duration, tenderness on exam, and imaging suggestive of high-grade bowel obstruction.  Based on the above and exploratory laparotomy is indicated.  Narrative: The procedure was discussed in detail with the patient including the risks of bleeding, infection, abscess, and hematoma.  I also discussed potential bowel resection and the potential for an ostomy.  He understood all the above and wished to proceed.  He was placed under anesthesia by Dr. Arnold.  His abdomen was prepped and draped with chlorhexidine prep and sterile drapes.  After a timeout a midline incision was made and through this incision the peritoneal cavity was entered.  Significant adhesions were encountered and these were carefully lysed.  Once the greater axis of been obtained the peritoneal cavity the bowel was mobilized.  A closed-loop obstruction with dusky bowel involving a relatively short second bowel was identified.  The adhesions causing the closed-loop were lysed and the bowel was noted to pink up nicely.  Multiple other adhesions on the bowel were then lysed.  The bowel was then run from the ligament of Treitz to the terminal ileum and no other abnormalities were noted.  After assuring hemostasis, the fascia was approximated using #1 looped PDS sutures.  The skin was stapled.  Sterile dressings were placed.  The patient taught procedure well without apparent complication.  Final counts were reported as correct.  Wound class was class II.    The assistant, Belkis Treviño PA-C, was necessary due to the complexity of  the operation.  She assisted with exposure and retraction.  Belkis also assisted with closure of the incision.

## 2023-04-02 NOTE — ED NOTES
20 Ga IV in the RAC tender and cold to the touch.     18 Ga established in proximal forearm and RAC IV removed.     Pt encouraged to provide UA.

## 2023-04-02 NOTE — ANESTHESIA PROCEDURE NOTES
Peripheral Block    Date/Time: 4/2/2023 1:35 PM  Performed by: Bhargav Arnold M.D.  Authorized by: Bhargav Arnold M.D.     Start Time:  4/2/2023 1:35 PM  End Time:  4/2/2023 1:38 PM  Reason for Block: at surgeon's request and post-op pain management ONLY    patient identified, IV checked, site marked, risks and benefits discussed, surgical consent, monitors and equipment checked, pre-op evaluation and timeout performed    Patient Position:  Supine  Prep: ChloraPrep    Monitoring:  Heart rate, continuous pulse ox and cardiac monitor  Block Region:  Trunk  Trunk - Block Type:  Abdominal plane block - TAP block    Laterality:  Bilateral  Procedures: ultrasound guided  Image captured, interpreted and electronically stored.  Local Infiltration:  Lidocaine  Strength:  1 %  Dose:  3 ml  Block Type:  Single-shot  Needle Length:  50mm  Needle Gauge:  22 G  Needle Localization:  Ultrasound guidance  Injection Assessment:  Negative aspiration for heme, no paresthesia on injection, incremental injection and local visualized surrounding nerve on ultrasound   Dexamethasone 2 mg in each side.

## 2023-04-02 NOTE — ED NOTES
Pt gave verbal permission to share information with his mother over the phone. Tina (mother) was updated on plan of care and planned surgery. Phone provided to pt.

## 2023-04-02 NOTE — ANESTHESIA PREPROCEDURE EVALUATION
Case: [id number] Date/Time: 04/02/23 1450    Procedure: LAPAROTOMY, EXPLORATORY  POSSIBLE BOWEL OBSTRUCTION    Location: [facility] OR 09 / SURGERY [facility]    Surgeons: John Abdalla M.D.          Relevant Problems   No relevant active problems       Physical Exam    Airway   Mallampati: II  TM distance: >3 FB  Neck ROM: full       Cardiovascular - normal exam  Rhythm: regular  Rate: normal  (-) murmur     Dental - normal exam           Pulmonary - normal exam  Breath sounds clear to auscultation     Abdominal    Neurological - normal exam                 Anesthesia Plan    ASA 2       Plan - general       Airway plan will be ETT                    Informed Consent:

## 2023-04-03 LAB
ANION GAP SERPL CALC-SCNC: 13 MMOL/L (ref 7–16)
BASOPHILS # BLD AUTO: 0.1 % (ref 0–1.8)
BASOPHILS # BLD: 0.02 K/UL (ref 0–0.12)
BUN SERPL-MCNC: 11 MG/DL (ref 8–22)
CALCIUM SERPL-MCNC: 8.6 MG/DL (ref 8.5–10.5)
CHLORIDE SERPL-SCNC: 105 MMOL/L (ref 96–112)
CO2 SERPL-SCNC: 22 MMOL/L (ref 20–33)
CREAT SERPL-MCNC: 0.9 MG/DL (ref 0.5–1.4)
EOSINOPHIL # BLD AUTO: 0 K/UL (ref 0–0.51)
EOSINOPHIL NFR BLD: 0 % (ref 0–6.9)
ERYTHROCYTE [DISTWIDTH] IN BLOOD BY AUTOMATED COUNT: 44.2 FL (ref 35.9–50)
GFR SERPLBLD CREATININE-BSD FMLA CKD-EPI: 104 ML/MIN/1.73 M 2
GLUCOSE SERPL-MCNC: 112 MG/DL (ref 65–99)
HCT VFR BLD AUTO: 46.6 % (ref 42–52)
HGB BLD-MCNC: 15.5 G/DL (ref 14–18)
IMM GRANULOCYTES # BLD AUTO: 0.07 K/UL (ref 0–0.11)
IMM GRANULOCYTES NFR BLD AUTO: 0.5 % (ref 0–0.9)
LYMPHOCYTES # BLD AUTO: 1.06 K/UL (ref 1–4.8)
LYMPHOCYTES NFR BLD: 7.6 % (ref 22–41)
MAGNESIUM SERPL-MCNC: 1.5 MG/DL (ref 1.5–2.5)
MCH RBC QN AUTO: 29.6 PG (ref 27–33)
MCHC RBC AUTO-ENTMCNC: 33.3 G/DL (ref 33.7–35.3)
MCV RBC AUTO: 88.9 FL (ref 81.4–97.8)
MONOCYTES # BLD AUTO: 1 K/UL (ref 0–0.85)
MONOCYTES NFR BLD AUTO: 7.1 % (ref 0–13.4)
NEUTROPHILS # BLD AUTO: 11.84 K/UL (ref 1.82–7.42)
NEUTROPHILS NFR BLD: 84.7 % (ref 44–72)
NRBC # BLD AUTO: 0 K/UL
NRBC BLD-RTO: 0 /100 WBC
PHOSPHATE SERPL-MCNC: 4 MG/DL (ref 2.5–4.5)
PLATELET # BLD AUTO: 179 K/UL (ref 164–446)
PMV BLD AUTO: 11.5 FL (ref 9–12.9)
POTASSIUM SERPL-SCNC: 3.9 MMOL/L (ref 3.6–5.5)
RBC # BLD AUTO: 5.24 M/UL (ref 4.7–6.1)
SODIUM SERPL-SCNC: 140 MMOL/L (ref 135–145)
WBC # BLD AUTO: 14 K/UL (ref 4.8–10.8)

## 2023-04-03 PROCEDURE — 700105 HCHG RX REV CODE 258: Performed by: SURGERY

## 2023-04-03 PROCEDURE — 84100 ASSAY OF PHOSPHORUS: CPT

## 2023-04-03 PROCEDURE — 80048 BASIC METABOLIC PNL TOTAL CA: CPT

## 2023-04-03 PROCEDURE — 770001 HCHG ROOM/CARE - MED/SURG/GYN PRIV*

## 2023-04-03 PROCEDURE — 700111 HCHG RX REV CODE 636 W/ 250 OVERRIDE (IP): Performed by: PHYSICIAN ASSISTANT

## 2023-04-03 PROCEDURE — 36415 COLL VENOUS BLD VENIPUNCTURE: CPT

## 2023-04-03 PROCEDURE — 83735 ASSAY OF MAGNESIUM: CPT

## 2023-04-03 PROCEDURE — 85025 COMPLETE CBC W/AUTO DIFF WBC: CPT

## 2023-04-03 PROCEDURE — 99024 POSTOP FOLLOW-UP VISIT: CPT | Performed by: PHYSICIAN ASSISTANT

## 2023-04-03 PROCEDURE — A9270 NON-COVERED ITEM OR SERVICE: HCPCS | Performed by: PHYSICIAN ASSISTANT

## 2023-04-03 PROCEDURE — 700102 HCHG RX REV CODE 250 W/ 637 OVERRIDE(OP): Performed by: PHYSICIAN ASSISTANT

## 2023-04-03 RX ORDER — HYDROMORPHONE HYDROCHLORIDE 1 MG/ML
0.5 INJECTION, SOLUTION INTRAMUSCULAR; INTRAVENOUS; SUBCUTANEOUS EVERY 4 HOURS PRN
Status: DISCONTINUED | OUTPATIENT
Start: 2023-04-03 | End: 2023-04-06 | Stop reason: HOSPADM

## 2023-04-03 RX ORDER — OXYCODONE HYDROCHLORIDE 5 MG/1
5 TABLET ORAL EVERY 4 HOURS PRN
Status: DISCONTINUED | OUTPATIENT
Start: 2023-04-03 | End: 2023-04-06 | Stop reason: HOSPADM

## 2023-04-03 RX ORDER — CELECOXIB 100 MG/1
100 CAPSULE ORAL 2 TIMES DAILY
Status: DISCONTINUED | OUTPATIENT
Start: 2023-04-03 | End: 2023-04-06 | Stop reason: HOSPADM

## 2023-04-03 RX ORDER — ACETAMINOPHEN 500 MG
1000 TABLET ORAL EVERY 6 HOURS
Status: DISCONTINUED | OUTPATIENT
Start: 2023-04-03 | End: 2023-04-06 | Stop reason: HOSPADM

## 2023-04-03 RX ORDER — OXYCODONE HYDROCHLORIDE 10 MG/1
10 TABLET ORAL EVERY 4 HOURS PRN
Status: DISCONTINUED | OUTPATIENT
Start: 2023-04-03 | End: 2023-04-06 | Stop reason: HOSPADM

## 2023-04-03 RX ADMIN — HYDROMORPHONE HYDROCHLORIDE 0.5 MG: 1 INJECTION, SOLUTION INTRAMUSCULAR; INTRAVENOUS; SUBCUTANEOUS at 16:15

## 2023-04-03 RX ADMIN — CELECOXIB 100 MG: 100 CAPSULE ORAL at 17:24

## 2023-04-03 RX ADMIN — SODIUM CHLORIDE, POTASSIUM CHLORIDE, SODIUM LACTATE AND CALCIUM CHLORIDE: 600; 310; 30; 20 INJECTION, SOLUTION INTRAVENOUS at 04:33

## 2023-04-03 RX ADMIN — OXYCODONE HYDROCHLORIDE 10 MG: 10 TABLET ORAL at 19:44

## 2023-04-03 RX ADMIN — SODIUM CHLORIDE, POTASSIUM CHLORIDE, SODIUM LACTATE AND CALCIUM CHLORIDE: 600; 310; 30; 20 INJECTION, SOLUTION INTRAVENOUS at 19:46

## 2023-04-03 RX ADMIN — OXYCODONE HYDROCHLORIDE 10 MG: 10 TABLET ORAL at 13:22

## 2023-04-03 RX ADMIN — SODIUM CHLORIDE, POTASSIUM CHLORIDE, SODIUM LACTATE AND CALCIUM CHLORIDE: 600; 310; 30; 20 INJECTION, SOLUTION INTRAVENOUS at 12:26

## 2023-04-03 ASSESSMENT — COGNITIVE AND FUNCTIONAL STATUS - GENERAL
WALKING IN HOSPITAL ROOM: A LITTLE
MOBILITY SCORE: 20
MOVING FROM LYING ON BACK TO SITTING ON SIDE OF FLAT BED: A LITTLE
STANDING UP FROM CHAIR USING ARMS: A LITTLE
CLIMB 3 TO 5 STEPS WITH RAILING: A LITTLE
SUGGESTED CMS G CODE MODIFIER DAILY ACTIVITY: CH
SUGGESTED CMS G CODE MODIFIER MOBILITY: CJ
DAILY ACTIVITIY SCORE: 24

## 2023-04-03 ASSESSMENT — LIFESTYLE VARIABLES
TOTAL SCORE: 0
ON A TYPICAL DAY WHEN YOU DRINK ALCOHOL HOW MANY DRINKS DO YOU HAVE: 0
TOTAL SCORE: 0
HAVE YOU EVER FELT YOU SHOULD CUT DOWN ON YOUR DRINKING: NO
CONSUMPTION TOTAL: NEGATIVE
HOW MANY TIMES IN THE PAST YEAR HAVE YOU HAD 5 OR MORE DRINKS IN A DAY: 0
ALCOHOL_USE: NO
EVER FELT BAD OR GUILTY ABOUT YOUR DRINKING: NO
HAVE PEOPLE ANNOYED YOU BY CRITICIZING YOUR DRINKING: NO
AVERAGE NUMBER OF DAYS PER WEEK YOU HAVE A DRINK CONTAINING ALCOHOL: 0
TOTAL SCORE: 0
EVER HAD A DRINK FIRST THING IN THE MORNING TO STEADY YOUR NERVES TO GET RID OF A HANGOVER: NO
DOES PATIENT WANT TO STOP DRINKING: NO

## 2023-04-03 ASSESSMENT — PAIN DESCRIPTION - PAIN TYPE
TYPE: ACUTE PAIN

## 2023-04-03 ASSESSMENT — COPD QUESTIONNAIRES
DO YOU EVER COUGH UP ANY MUCUS OR PHLEGM?: NO/ONLY WITH OCCASIONAL COLDS OR INFECTIONS
COPD SCREENING SCORE: 0
DURING THE PAST 4 WEEKS HOW MUCH DID YOU FEEL SHORT OF BREATH: NONE/LITTLE OF THE TIME
HAVE YOU SMOKED AT LEAST 100 CIGARETTES IN YOUR ENTIRE LIFE: NO/DON'T KNOW

## 2023-04-03 NOTE — PROGRESS NOTES
"Bedside report received.  Assessment complete.  A&O x 4. Patient calls appropriately.  Patient ambulates with standby assist. Bed alarm off.   Patient has 7/10 pain. Pain managed with prescribed medications via PCA.  Denies N&V. NPO diet.  Surgical dressing small amount new drainage.  + void, - flatus, - BM.  Patient denies SOB.  SCD's on.  Patient is calm and cooperative with the care plan.  Review plan with of care with patient. Call light and personal belongings within reach. Hourly rounding in place. All needs met at this time.   /76   Pulse 62   Temp 37.3 °C (99.1 °F) (Temporal)   Resp 18   Ht 1.753 m (5' 9\")   Wt 59 kg (130 lb)   SpO2 96%   BMI 19.20 kg/m²     "

## 2023-04-03 NOTE — CARE PLAN
The patient is Stable - Low risk of patient condition declining or worsening    Shift Goals  Clinical Goals: Pain control, diet tolerance  Patient Goals: Rest    Progress made toward(s) clinical / shift goals:  pt pain medicated per MAR, Pt educated on NPO status      Problem: Knowledge Deficit - Standard  Goal: Patient and family/care givers will demonstrate understanding of plan of care, disease process/condition, diagnostic tests and medications  Outcome: Progressing     Problem: Pain - Standard  Goal: Alleviation of pain or a reduction in pain to the patient’s comfort goal  Outcome: Progressing

## 2023-04-03 NOTE — PROGRESS NOTES
4 Eyes Skin Assessment Completed by Boni, RN and Karla, RN.    Head WDL  Ears WDL  Nose WDL  Mouth WDL  Neck WDL  Breast/Chest WDL  Shoulder Blades WDL  Spine WDL  (R) Arm/Elbow/Hand WDL  (L) Arm/Elbow/Hand WDL  Abdomen MLI, DIP.  Groin WDL  Scrotum/Coccyx/Buttocks WDL  (R) Leg WDL  (L) Leg WDL  (R) Heel/Foot/Toe WDL  (L) Heel/Foot/Toe WDL          Devices In Places Pulse Ox, SCD's, and Nasal Cannula      Interventions In Place NC W/Ear Foams, Pillows, and Pressure Redistribution Mattress    Possible Skin Injury No    Pictures Uploaded Into Epic N/A  Wound Consult Placed N/A  RN Wound Prevention Protocol Ordered No

## 2023-04-03 NOTE — CARE PLAN
Problem: Knowledge Deficit - Standard  Goal: Patient and family/care givers will demonstrate understanding of plan of care, disease process/condition, diagnostic tests and medications  Outcome: Progressing     Problem: Pain - Standard  Goal: Alleviation of pain or a reduction in pain to the patient’s comfort goal  Outcome: Progressing   The patient is Stable - Low risk of patient condition declining or worsening    Shift Goals  Clinical Goals: pain control, monitor bowel function  Patient Goals: pain control    Progress made toward(s) clinical / shift goals:  pain managed with ordered medication. Bowel  function has not returned.    Patient is not progressing towards the following goals:

## 2023-04-03 NOTE — ASSESSMENT & PLAN NOTE
Midepigastric pain with nausea and emesis x12 hours, remote history of abdominal surgery.  4/2 Exploratory laparotomy, lysis of adhesions.  4/4 Start clear liquid diet.

## 2023-04-03 NOTE — PROGRESS NOTES
"  DATE: 4/3/2023    Post Operative Day  1  Expl lap  and WILLARD .    INTERVAL EVENTS:  Pain control poor.  No N/V, no flatus or stool.    PHYSICAL EXAMINATION:  Vital Signs: /76   Pulse 62   Temp 37.3 °C (99.1 °F) (Temporal)   Resp 18   Ht 1.753 m (5' 9\")   Wt 59 kg (130 lb)   SpO2 96%     Physical Exam  Abdominal:      General: Abdomen is flat.      Palpations: Abdomen is soft.      Tenderness: There is generalized abdominal tenderness.      Comments: Dressing CDI       LABORATORY VALUES:   Recent Labs     04/02/23  0540 04/03/23  0326   WBC 7.9 14.0*   RBC 5.57 5.24   HEMOGLOBIN 16.4 15.5   HEMATOCRIT 47.9 46.6   MCV 86.0 88.9   MCH 29.4 29.6   MCHC 34.2 33.3*   RDW 43.0 44.2   PLATELETCT 203 179   MPV 11.2 11.5     Recent Labs     04/02/23  0540 04/03/23  0326   SODIUM 142 140   POTASSIUM 3.8 3.9   CHLORIDE 108 105   CO2 21 22   GLUCOSE 89 112*   BUN 8 11   CREATININE 0.85 0.90   CALCIUM 9.2 8.6     Recent Labs     04/02/23  0540   ASTSGOT 20   ALTSGPT 11   TBILIRUBIN 0.8   ALKPHOSPHAT 75   GLOBULIN 3.2            IMAGING:   CT-ABDOMEN-PELVIS WITH   Final Result      1.  Within the upper to mid abdomen there are multiple dilated small bowel loops with air-fluid levels in keeping with small bowel obstruction. There is probable transition in the anterior mid abdomen at the level of the umbilicus. In this region is a    rounded structure measuring about 4.3 x 3.5 cm which is of uncertain etiology, possibly an abnormal, potentially devascularized short segment small bowel versus a nonspecific mass lesion.      CT-HEAD W/O   Final Result      1.  No acute intracranial abnormality.   2.  Paranasal sinus disease. Correlate for acute sinusitis.                      ASSESSMENT AND PLAN:   * Small bowel obstruction (HCC)- (present on admission)  Assessment & Plan  Midepigastric pain with nausea and emesis x12 hours, remote history of abdominal surgery.  4/2 Exploratory laparotomy, lysis of adhesions.  NGT to " suction, IVF.  Await GI function.      - Ice chips okay  - Mobilize aggressively       ____________________________________     Belkis Treviño P.A.-C.    DD: 4/3/2023  9:37 AM

## 2023-04-03 NOTE — PROGRESS NOTES
Received report from previous shift RN  Assessment complete.  A&O x 4. Patient calls appropriately.  Patient ambulates with x1 assist w/ FWW. Bed alarm off.   Patient has 2/10 pain. Pain managed with prescribed medications.  Denies N&V. Currently NPO sips with meds  Surgical MLI, DIP, old drainage.  + void, - flatus, - BM.  Patient denies SOB.  SCD's on.    Review plan of care with patient. Call light and personal belongings with in reach. Hourly rounding in place. All needs met at this time.

## 2023-04-03 NOTE — PROGRESS NOTES
Patient to floor with transport in stable condition. VSS. Surgical dressings clean dry intact. Aox4 and on 2 l O2. Belongings returned. Family updated. No further needs.

## 2023-04-04 LAB
ANION GAP SERPL CALC-SCNC: 10 MMOL/L (ref 7–16)
BASOPHILS # BLD AUTO: 0.2 % (ref 0–1.8)
BASOPHILS # BLD: 0.02 K/UL (ref 0–0.12)
BUN SERPL-MCNC: 12 MG/DL (ref 8–22)
CALCIUM SERPL-MCNC: 8.5 MG/DL (ref 8.5–10.5)
CHLORIDE SERPL-SCNC: 106 MMOL/L (ref 96–112)
CO2 SERPL-SCNC: 24 MMOL/L (ref 20–33)
CREAT SERPL-MCNC: 0.74 MG/DL (ref 0.5–1.4)
EOSINOPHIL # BLD AUTO: 0.07 K/UL (ref 0–0.51)
EOSINOPHIL NFR BLD: 0.8 % (ref 0–6.9)
ERYTHROCYTE [DISTWIDTH] IN BLOOD BY AUTOMATED COUNT: 45.5 FL (ref 35.9–50)
GFR SERPLBLD CREATININE-BSD FMLA CKD-EPI: 110 ML/MIN/1.73 M 2
GLUCOSE SERPL-MCNC: 76 MG/DL (ref 65–99)
HCT VFR BLD AUTO: 44.6 % (ref 42–52)
HGB BLD-MCNC: 14.4 G/DL (ref 14–18)
IMM GRANULOCYTES # BLD AUTO: 0.05 K/UL (ref 0–0.11)
IMM GRANULOCYTES NFR BLD AUTO: 0.5 % (ref 0–0.9)
LYMPHOCYTES # BLD AUTO: 2.49 K/UL (ref 1–4.8)
LYMPHOCYTES NFR BLD: 26.7 % (ref 22–41)
MCH RBC QN AUTO: 29.8 PG (ref 27–33)
MCHC RBC AUTO-ENTMCNC: 32.3 G/DL (ref 33.7–35.3)
MCV RBC AUTO: 92.3 FL (ref 81.4–97.8)
MONOCYTES # BLD AUTO: 0.64 K/UL (ref 0–0.85)
MONOCYTES NFR BLD AUTO: 6.9 % (ref 0–13.4)
NEUTROPHILS # BLD AUTO: 6.06 K/UL (ref 1.82–7.42)
NEUTROPHILS NFR BLD: 64.9 % (ref 44–72)
NRBC # BLD AUTO: 0 K/UL
NRBC BLD-RTO: 0 /100 WBC
PLATELET # BLD AUTO: 155 K/UL (ref 164–446)
PMV BLD AUTO: 10.9 FL (ref 9–12.9)
POTASSIUM SERPL-SCNC: 4 MMOL/L (ref 3.6–5.5)
RBC # BLD AUTO: 4.83 M/UL (ref 4.7–6.1)
SODIUM SERPL-SCNC: 140 MMOL/L (ref 135–145)
WBC # BLD AUTO: 9.3 K/UL (ref 4.8–10.8)

## 2023-04-04 PROCEDURE — 80048 BASIC METABOLIC PNL TOTAL CA: CPT

## 2023-04-04 PROCEDURE — A9270 NON-COVERED ITEM OR SERVICE: HCPCS | Performed by: PHYSICIAN ASSISTANT

## 2023-04-04 PROCEDURE — 700111 HCHG RX REV CODE 636 W/ 250 OVERRIDE (IP): Performed by: PHYSICIAN ASSISTANT

## 2023-04-04 PROCEDURE — 99024 POSTOP FOLLOW-UP VISIT: CPT | Performed by: PHYSICIAN ASSISTANT

## 2023-04-04 PROCEDURE — 36415 COLL VENOUS BLD VENIPUNCTURE: CPT

## 2023-04-04 PROCEDURE — 700105 HCHG RX REV CODE 258: Performed by: SURGERY

## 2023-04-04 PROCEDURE — 85025 COMPLETE CBC W/AUTO DIFF WBC: CPT

## 2023-04-04 PROCEDURE — 770001 HCHG ROOM/CARE - MED/SURG/GYN PRIV*

## 2023-04-04 PROCEDURE — 99406 BEHAV CHNG SMOKING 3-10 MIN: CPT

## 2023-04-04 PROCEDURE — 700102 HCHG RX REV CODE 250 W/ 637 OVERRIDE(OP): Performed by: PHYSICIAN ASSISTANT

## 2023-04-04 RX ORDER — CHOLECALCIFEROL (VITAMIN D3) 125 MCG
5 CAPSULE ORAL NIGHTLY
Status: DISCONTINUED | OUTPATIENT
Start: 2023-04-04 | End: 2023-04-06 | Stop reason: HOSPADM

## 2023-04-04 RX ADMIN — ACETAMINOPHEN 1000 MG: 500 TABLET, FILM COATED ORAL at 11:34

## 2023-04-04 RX ADMIN — OXYCODONE HYDROCHLORIDE 10 MG: 10 TABLET ORAL at 16:46

## 2023-04-04 RX ADMIN — Medication 5 MG: at 22:36

## 2023-04-04 RX ADMIN — HYDROMORPHONE HYDROCHLORIDE 0.5 MG: 1 INJECTION, SOLUTION INTRAMUSCULAR; INTRAVENOUS; SUBCUTANEOUS at 10:15

## 2023-04-04 RX ADMIN — OXYCODONE HYDROCHLORIDE 10 MG: 10 TABLET ORAL at 04:08

## 2023-04-04 RX ADMIN — OXYCODONE HYDROCHLORIDE 10 MG: 10 TABLET ORAL at 08:32

## 2023-04-04 RX ADMIN — SODIUM CHLORIDE, POTASSIUM CHLORIDE, SODIUM LACTATE AND CALCIUM CHLORIDE: 600; 310; 30; 20 INJECTION, SOLUTION INTRAVENOUS at 03:54

## 2023-04-04 RX ADMIN — ACETAMINOPHEN 1000 MG: 500 TABLET, FILM COATED ORAL at 00:07

## 2023-04-04 RX ADMIN — ACETAMINOPHEN 1000 MG: 500 TABLET, FILM COATED ORAL at 16:47

## 2023-04-04 RX ADMIN — OXYCODONE HYDROCHLORIDE 10 MG: 10 TABLET ORAL at 00:08

## 2023-04-04 RX ADMIN — CELECOXIB 100 MG: 100 CAPSULE ORAL at 04:07

## 2023-04-04 RX ADMIN — OXYCODONE HYDROCHLORIDE 10 MG: 10 TABLET ORAL at 12:39

## 2023-04-04 RX ADMIN — OXYCODONE HYDROCHLORIDE 10 MG: 10 TABLET ORAL at 20:50

## 2023-04-04 RX ADMIN — CELECOXIB 100 MG: 100 CAPSULE ORAL at 16:46

## 2023-04-04 RX ADMIN — SODIUM CHLORIDE, POTASSIUM CHLORIDE, SODIUM LACTATE AND CALCIUM CHLORIDE: 600; 310; 30; 20 INJECTION, SOLUTION INTRAVENOUS at 12:05

## 2023-04-04 ASSESSMENT — PAIN DESCRIPTION - PAIN TYPE
TYPE: ACUTE PAIN
TYPE: ACUTE PAIN;SURGICAL PAIN
TYPE: ACUTE PAIN
TYPE: ACUTE PAIN;SURGICAL PAIN
TYPE: ACUTE PAIN
TYPE: ACUTE PAIN;SURGICAL PAIN
TYPE: ACUTE PAIN
TYPE: ACUTE PAIN
TYPE: ACUTE PAIN;SURGICAL PAIN
TYPE: ACUTE PAIN

## 2023-04-04 NOTE — PROGRESS NOTES
Bedside report received.  Assessment complete.  A&O x 4. Patient calls appropriately.  Patient ambulates with standby assist.  Patient has 10/10 pain. Pain managed with prescribed medications.  Denies N&V. Pt is NPO sips with meds.  Midline incision with island dressing and old drainage.  + void, - flatus, - BM.  Patient denies SOB.  Review plan with of care with patient. Call light and personal belongings within reach. Hourly rounding in place. All needs met at this time.

## 2023-04-04 NOTE — RESPIRATORY CARE
" COPD EDUCATION by COPD CLINICAL EDUCATOR  4/4/2023 at 11:48 AM by Tai Carranza RRT     Smoking Cessation Intervention and education completed, 8 minutes spent on smoking cessation education with patient.    Provided smoking cessation packet with \"Tips to Quit\" and brochure for \"Free Virtual Smoking Cessation Classes\".  Discussed importance/benefits of quitting.      COPD Screen  COPD Risk Screening  Do you have a history of COPD?: No  COPD Population Screener  During the past 4 weeks, how much did you feel short of breath?: None/Little of the time  Do you ever cough up any mucus or phlegm?: No/only with occasional colds or infections  In the past 12 months, you do less than you used to because of your breathing problems: Disagree/unsure  Have you smoked at least 100 cigarettes in your entire life?: No/don't know  How old are you?: 35-51  COPD Screening Score: 0  COPD Coordinator Not Recommended: Yes    COPD Assessment  COPD Clinical Specialists ONLY  COPD Education Initiated: Yes--Short Intervention (smoking cessation only)  Referrals Initiated: Yes  Smoking Cessation: Yes (Patient receptive to quitting.  Discussed importance/benefits of quitting.  Booklet provided.)  $ Smoking Cessation 3-10 Minutes: Symptomatic (8 minutes)    PFT Results    No results found for: PFT    Meds to Beds  Would the patient like to opt in for Bedside Medication Delivery at Discharge?: No                 "

## 2023-04-04 NOTE — PROGRESS NOTES
"IBedside report received.  Assessment complete.  A&O x 4. Patient calls appropriately.  Patient ambulates with standby assist. Bed alarm off.   Patient has 0-7/10 pain. Pain managed with prescribed medications.  Denies N&V. Tolerating clear liquid diet.  Surgical dressing changed, incision clean staples intact.  + void, + flatus, last BM PTA.  Patient denies SOB.  SCD's on.  Patient calm and cooperative with the care plan.  Review plan with of care with patient. Call light and personal belongings within reach. Hourly rounding in place. All needs met at this time.   BP (!) 139/93   Pulse 60   Temp 37.2 °C (99 °F) (Temporal)   Resp 16   Ht 1.753 m (5' 9\")   Wt 59 kg (130 lb)   SpO2 93%   BMI 19.20 kg/m²     "

## 2023-04-04 NOTE — DISCHARGE PLANNING
CHW Susan met with patient bedside and introduced Community Care Management and completed SDOH. Pt lives with children and is employed and a active . Pt expresses the need for assistance with rent/ utilities. Pt does not receive SNAP benefits and is not established with PCP and has agreed to CHW to assist with an appointment. Pt denies any barriers with medications and is confident in managing his health. Pt will have transportation home at the time of discharge .   Community Health Worker Intake  Social determinates of health intake Completed.   Identified barriers to Rent/ Utility assistance.  Contact information provided to Tariq Rivera Yes  Has PCP appointment scheduled for 05/09/23 @ 8:45 am  Scheduled Food Delivery/Home Visit/Outpatient Visit: No  Accepted/Declined Meds-To-Beds. Yes  Inpatient assessment completed.       Plan:  Schedule pt to establish with a new PCP 05/09/23 @ 8:45 am.  Provided Accept resource to assist with rent and utility.  Provided [facility] for food assistance.  Provided contact information.  Will continue to follow after discharge.

## 2023-04-04 NOTE — PROGRESS NOTES
"  DATE: 4/4/2023    Post Operative Day  2 s/p  Expl lap  and WILLARD .    INTERVAL EVENTS:  Pain controlled.  No N/V, + flatus, -BM.    PHYSICAL EXAMINATION:  Vital Signs: BP (!) 139/96   Pulse 62   Temp 37.1 °C (98.8 °F) (Temporal)   Resp 17   Ht 1.753 m (5' 9\")   Wt 59 kg (130 lb)   SpO2 90%     Physical Exam  Abdominal:      General: Abdomen is flat.      Palpations: Abdomen is soft.      Tenderness: There is generalized abdominal tenderness.      Comments: Incision CDI with staples       LABORATORY VALUES:   Recent Labs     04/02/23  0540 04/03/23  0326 04/04/23  0143   WBC 7.9 14.0* 9.3   RBC 5.57 5.24 4.83   HEMOGLOBIN 16.4 15.5 14.4   HEMATOCRIT 47.9 46.6 44.6   MCV 86.0 88.9 92.3   MCH 29.4 29.6 29.8   MCHC 34.2 33.3* 32.3*   RDW 43.0 44.2 45.5   PLATELETCT 203 179 155*   MPV 11.2 11.5 10.9       Recent Labs     04/02/23  0540 04/03/23  0326 04/04/23  0143   SODIUM 142 140 140   POTASSIUM 3.8 3.9 4.0   CHLORIDE 108 105 106   CO2 21 22 24   GLUCOSE 89 112* 76   BUN 8 11 12   CREATININE 0.85 0.90 0.74   CALCIUM 9.2 8.6 8.5       Recent Labs     04/02/23  0540   ASTSGOT 20   ALTSGPT 11   TBILIRUBIN 0.8   ALKPHOSPHAT 75   GLOBULIN 3.2              IMAGING:   CT-ABDOMEN-PELVIS WITH   Final Result      1.  Within the upper to mid abdomen there are multiple dilated small bowel loops with air-fluid levels in keeping with small bowel obstruction. There is probable transition in the anterior mid abdomen at the level of the umbilicus. In this region is a    rounded structure measuring about 4.3 x 3.5 cm which is of uncertain etiology, possibly an abnormal, potentially devascularized short segment small bowel versus a nonspecific mass lesion.      CT-HEAD W/O   Final Result      1.  No acute intracranial abnormality.   2.  Paranasal sinus disease. Correlate for acute sinusitis.                      ASSESSMENT AND PLAN:   * Small bowel obstruction (HCC)- (present on admission)  Assessment & Plan  Midepigastric pain " with nausea and emesis x12 hours, remote history of abdominal surgery.  4/2 Exploratory laparotomy, lysis of adhesions.  4/4 Start clear liquid diet.      - Mobilize aggressively       ____________________________________     Belkis Treviño P.A.-C.    DD: 4/3/2023  9:37 AM

## 2023-04-04 NOTE — CARE PLAN
The patient is Stable - Low risk of patient condition declining or worsening    Shift Goals  Clinical Goals: pain control, bowel function  Patient Goals: pain control, BM    Progress made toward(s) clinical / shift goals:  Pain being controlled with PRN pain medication per MAR, heat packs and rest. Pt attempted to have BM, but didn't. However, pt now passing gas.      Problem: Knowledge Deficit - Standard  Goal: Patient and family/care givers will demonstrate understanding of plan of care, disease process/condition, diagnostic tests and medications  Outcome: Progressing     Problem: Pain - Standard  Goal: Alleviation of pain or a reduction in pain to the patient’s comfort goal  Outcome: Progressing       Patient is not progressing towards the following goals:

## 2023-04-04 NOTE — CARE PLAN
Problem: Knowledge Deficit - Standard  Goal: Patient and family/care givers will demonstrate understanding of plan of care, disease process/condition, diagnostic tests and medications  Outcome: Progressing     Problem: Pain - Standard  Goal: Alleviation of pain or a reduction in pain to the patient’s comfort goal  Outcome: Progressing   The patient is Stable - Low risk of patient condition declining or worsening    Shift Goals  Clinical Goals: pain control, monitor bowel function  Patient Goals: rest    Progress made toward(s) clinical / shift goals:  pain managed with ordered medications. Patent passing flatus, tolerating po intake.    Patient is not progressing towards the following goals:

## 2023-04-05 LAB
ANION GAP SERPL CALC-SCNC: 9 MMOL/L (ref 7–16)
BASOPHILS # BLD AUTO: 0.3 % (ref 0–1.8)
BASOPHILS # BLD: 0.02 K/UL (ref 0–0.12)
BUN SERPL-MCNC: 8 MG/DL (ref 8–22)
CALCIUM SERPL-MCNC: 8.9 MG/DL (ref 8.5–10.5)
CHLORIDE SERPL-SCNC: 104 MMOL/L (ref 96–112)
CO2 SERPL-SCNC: 27 MMOL/L (ref 20–33)
CREAT SERPL-MCNC: 0.76 MG/DL (ref 0.5–1.4)
EOSINOPHIL # BLD AUTO: 0.19 K/UL (ref 0–0.51)
EOSINOPHIL NFR BLD: 2.7 % (ref 0–6.9)
ERYTHROCYTE [DISTWIDTH] IN BLOOD BY AUTOMATED COUNT: 42.9 FL (ref 35.9–50)
GFR SERPLBLD CREATININE-BSD FMLA CKD-EPI: 109 ML/MIN/1.73 M 2
GLUCOSE SERPL-MCNC: 111 MG/DL (ref 65–99)
HCT VFR BLD AUTO: 45.8 % (ref 42–52)
HGB BLD-MCNC: 15.2 G/DL (ref 14–18)
IMM GRANULOCYTES # BLD AUTO: 0.02 K/UL (ref 0–0.11)
IMM GRANULOCYTES NFR BLD AUTO: 0.3 % (ref 0–0.9)
LYMPHOCYTES # BLD AUTO: 2.35 K/UL (ref 1–4.8)
LYMPHOCYTES NFR BLD: 33.1 % (ref 22–41)
MCH RBC QN AUTO: 29.7 PG (ref 27–33)
MCHC RBC AUTO-ENTMCNC: 33.2 G/DL (ref 33.7–35.3)
MCV RBC AUTO: 89.5 FL (ref 81.4–97.8)
MONOCYTES # BLD AUTO: 0.58 K/UL (ref 0–0.85)
MONOCYTES NFR BLD AUTO: 8.2 % (ref 0–13.4)
NEUTROPHILS # BLD AUTO: 3.94 K/UL (ref 1.82–7.42)
NEUTROPHILS NFR BLD: 55.4 % (ref 44–72)
NRBC # BLD AUTO: 0 K/UL
NRBC BLD-RTO: 0 /100 WBC
PLATELET # BLD AUTO: 174 K/UL (ref 164–446)
PMV BLD AUTO: 10.8 FL (ref 9–12.9)
POTASSIUM SERPL-SCNC: 3.8 MMOL/L (ref 3.6–5.5)
RBC # BLD AUTO: 5.12 M/UL (ref 4.7–6.1)
SODIUM SERPL-SCNC: 140 MMOL/L (ref 135–145)
WBC # BLD AUTO: 7.1 K/UL (ref 4.8–10.8)

## 2023-04-05 PROCEDURE — A9270 NON-COVERED ITEM OR SERVICE: HCPCS | Performed by: SURGERY

## 2023-04-05 PROCEDURE — A9270 NON-COVERED ITEM OR SERVICE: HCPCS | Performed by: PHYSICIAN ASSISTANT

## 2023-04-05 PROCEDURE — 700102 HCHG RX REV CODE 250 W/ 637 OVERRIDE(OP): Performed by: SURGERY

## 2023-04-05 PROCEDURE — 80048 BASIC METABOLIC PNL TOTAL CA: CPT

## 2023-04-05 PROCEDURE — 700102 HCHG RX REV CODE 250 W/ 637 OVERRIDE(OP): Performed by: PHYSICIAN ASSISTANT

## 2023-04-05 PROCEDURE — 85025 COMPLETE CBC W/AUTO DIFF WBC: CPT

## 2023-04-05 PROCEDURE — 770001 HCHG ROOM/CARE - MED/SURG/GYN PRIV*

## 2023-04-05 RX ORDER — POLYETHYLENE GLYCOL 3350 17 G/17G
1 POWDER, FOR SOLUTION ORAL 2 TIMES DAILY
Status: DISCONTINUED | OUTPATIENT
Start: 2023-04-05 | End: 2023-04-06 | Stop reason: HOSPADM

## 2023-04-05 RX ADMIN — ACETAMINOPHEN 1000 MG: 500 TABLET, FILM COATED ORAL at 17:04

## 2023-04-05 RX ADMIN — OXYCODONE HYDROCHLORIDE 10 MG: 10 TABLET ORAL at 21:10

## 2023-04-05 RX ADMIN — OXYCODONE HYDROCHLORIDE 10 MG: 10 TABLET ORAL at 17:03

## 2023-04-05 RX ADMIN — OXYCODONE HYDROCHLORIDE 10 MG: 10 TABLET ORAL at 04:48

## 2023-04-05 RX ADMIN — CELECOXIB 100 MG: 100 CAPSULE ORAL at 17:04

## 2023-04-05 RX ADMIN — POLYETHYLENE GLYCOL 3350 1 PACKET: 17 POWDER, FOR SOLUTION ORAL at 08:51

## 2023-04-05 RX ADMIN — POLYETHYLENE GLYCOL 3350 1 PACKET: 17 POWDER, FOR SOLUTION ORAL at 17:04

## 2023-04-05 RX ADMIN — Medication 5 MG: at 21:10

## 2023-04-05 RX ADMIN — OXYCODONE HYDROCHLORIDE 10 MG: 10 TABLET ORAL at 00:43

## 2023-04-05 RX ADMIN — OXYCODONE HYDROCHLORIDE 10 MG: 10 TABLET ORAL at 08:53

## 2023-04-05 RX ADMIN — CELECOXIB 100 MG: 100 CAPSULE ORAL at 04:48

## 2023-04-05 RX ADMIN — OXYCODONE HYDROCHLORIDE 10 MG: 10 TABLET ORAL at 13:03

## 2023-04-05 RX ADMIN — ACETAMINOPHEN 1000 MG: 500 TABLET, FILM COATED ORAL at 11:44

## 2023-04-05 ASSESSMENT — PAIN DESCRIPTION - PAIN TYPE
TYPE: ACUTE PAIN
TYPE: ACUTE PAIN;SURGICAL PAIN
TYPE: ACUTE PAIN
TYPE: ACUTE PAIN;SURGICAL PAIN
TYPE: ACUTE PAIN
TYPE: ACUTE PAIN;SURGICAL PAIN
TYPE: ACUTE PAIN
TYPE: ACUTE PAIN;SURGICAL PAIN

## 2023-04-05 NOTE — CARE PLAN
The patient is Stable - Low risk of patient condition declining or worsening    Shift Goals  Clinical Goals: pain control  Patient Goals: advance diet    Progress made toward(s) clinical / shift goals:  Pain more controlled today by use of q4 oxy PRN, distraction and rest. Pt tolerating clear liquid diet and hopes to advance to fulls.      Problem: Knowledge Deficit - Standard  Goal: Patient and family/care givers will demonstrate understanding of plan of care, disease process/condition, diagnostic tests and medications  Outcome: Progressing     Problem: Pain - Standard  Goal: Alleviation of pain or a reduction in pain to the patient’s comfort goal  Outcome: Progressing       Patient is not progressing towards the following goals:

## 2023-04-05 NOTE — CARE PLAN
Problem: Knowledge Deficit - Standard  Goal: Patient and family/care givers will demonstrate understanding of plan of care, disease process/condition, diagnostic tests and medications  Outcome: Progressing     Problem: Pain - Standard  Goal: Alleviation of pain or a reduction in pain to the patient’s comfort goal  Outcome: Progressing   The patient is Stable - Low risk of patient condition declining or worsening    Shift Goals  Clinical Goals: pain control, advance diet  Patient Goals: rest    Progress made toward(s) clinical / shift goals:  pain managed with ordered medications. Diet advanced to regular, patient tolerating.    Patient is not progressing towards the following goals:

## 2023-04-05 NOTE — PROGRESS NOTES
Bedside report received.  Assessment complete.  A&O x 4. Patient calls appropriately.  Patient ambulates with standby assist.  Patient has 6/10 pain. Pain managed with prescribed medications.  Denies N&V. Pt tolerating clear liquid diet.  Midline incision with island dressing CDI.  + void, + flatus, - BM.  Patient denies SOB.  SCDs on.  Review plan with of care with patient. Call light and personal belongings within reach. Hourly rounding in place. All needs met at this time.

## 2023-04-05 NOTE — PROGRESS NOTES
"  DATE: 4/5/2023    Post Operative Day  3 s/p  Expl lap  and WILLARD .    INTERVAL EVENTS:  Pain controlled.  No N/V, + flatus, -BM.  Tolerating clears    PHYSICAL EXAMINATION:  Vital Signs: /88   Pulse (!) 55   Temp 37 °C (98.6 °F) (Temporal)   Resp 20   Ht 1.753 m (5' 9\")   Wt 59 kg (130 lb)   SpO2 93%     Physical Exam  Abdominal:      General: There is distension.      Palpations: Abdomen is soft.      Tenderness: There is generalized abdominal tenderness.      Comments: Incision CDI with staples       LABORATORY VALUES:   Recent Labs     04/03/23  0326 04/04/23  0143 04/05/23  0215   WBC 14.0* 9.3 7.1   RBC 5.24 4.83 5.12   HEMOGLOBIN 15.5 14.4 15.2   HEMATOCRIT 46.6 44.6 45.8   MCV 88.9 92.3 89.5   MCH 29.6 29.8 29.7   MCHC 33.3* 32.3* 33.2*   RDW 44.2 45.5 42.9   PLATELETCT 179 155* 174   MPV 11.5 10.9 10.8       Recent Labs     04/03/23  0326 04/04/23  0143 04/05/23  0215   SODIUM 140 140 140   POTASSIUM 3.9 4.0 3.8   CHLORIDE 105 106 104   CO2 22 24 27   GLUCOSE 112* 76 111*   BUN 11 12 8   CREATININE 0.90 0.74 0.76   CALCIUM 8.6 8.5 8.9                    IMAGING:   CT-ABDOMEN-PELVIS WITH   Final Result      1.  Within the upper to mid abdomen there are multiple dilated small bowel loops with air-fluid levels in keeping with small bowel obstruction. There is probable transition in the anterior mid abdomen at the level of the umbilicus. In this region is a    rounded structure measuring about 4.3 x 3.5 cm which is of uncertain etiology, possibly an abnormal, potentially devascularized short segment small bowel versus a nonspecific mass lesion.      CT-HEAD W/O   Final Result      1.  No acute intracranial abnormality.   2.  Paranasal sinus disease. Correlate for acute sinusitis.                      ASSESSMENT AND PLAN:   * Small bowel obstruction (HCC)- (present on admission)  Assessment & Plan  Midepigastric pain with nausea and emesis x12 hours, remote history of abdominal surgery.  4/2 " Exploratory laparotomy, lysis of adhesions.  4/4 Start clear liquid diet.      -Strongly encouraged to walk in the hallways 2-3 times today  -Regular diet  -Miralax BID added       ____________________________________     Josh Keene M.D.

## 2023-04-05 NOTE — PROGRESS NOTES
"Bedside report received.  Assessment complete.  A&O x 4. Patient calls appropriately.  Patient ambulates with no assist. Bed alarm off.   Patient has 7/10 pain. Pain managed with prescribed medications.  Denies N&V. Tolerating clear liquid diet, advanced to regular diet.  Surgical dressing CDI scant shadowing.  + void, + flatus, last BM PTA.  Patient denies SOB.  SCD's on.  Patient is calm and cooperative with the care plan.  Review plan with of care with patient. Call light and personal belongings within reach. Hourly rounding in place. All needs met at this time.   /88   Pulse (!) 55   Temp 37 °C (98.6 °F) (Temporal)   Resp 20   Ht 1.753 m (5' 9\")   Wt 59 kg (130 lb)   SpO2 93%   BMI 19.20 kg/m²     "

## 2023-04-06 ENCOUNTER — PHARMACY VISIT (OUTPATIENT)
Dept: PHARMACY | Facility: MEDICAL CENTER | Age: 53
End: 2023-04-06
Payer: COMMERCIAL

## 2023-04-06 VITALS
HEART RATE: 61 BPM | DIASTOLIC BLOOD PRESSURE: 91 MMHG | WEIGHT: 130 LBS | BODY MASS INDEX: 19.26 KG/M2 | OXYGEN SATURATION: 98 % | TEMPERATURE: 97.3 F | HEIGHT: 69 IN | RESPIRATION RATE: 17 BRPM | SYSTOLIC BLOOD PRESSURE: 140 MMHG

## 2023-04-06 LAB
ANION GAP SERPL CALC-SCNC: 18 MMOL/L (ref 7–16)
BASOPHILS # BLD AUTO: 0.5 % (ref 0–1.8)
BASOPHILS # BLD: 0.03 K/UL (ref 0–0.12)
BUN SERPL-MCNC: 12 MG/DL (ref 8–22)
CALCIUM SERPL-MCNC: 8.8 MG/DL (ref 8.5–10.5)
CHLORIDE SERPL-SCNC: 106 MMOL/L (ref 96–112)
CO2 SERPL-SCNC: 16 MMOL/L (ref 20–33)
CREAT SERPL-MCNC: 0.86 MG/DL (ref 0.5–1.4)
EOSINOPHIL # BLD AUTO: 0.28 K/UL (ref 0–0.51)
EOSINOPHIL NFR BLD: 4.7 % (ref 0–6.9)
ERYTHROCYTE [DISTWIDTH] IN BLOOD BY AUTOMATED COUNT: 42.5 FL (ref 35.9–50)
GFR SERPLBLD CREATININE-BSD FMLA CKD-EPI: 105 ML/MIN/1.73 M 2
GLUCOSE SERPL-MCNC: 97 MG/DL (ref 65–99)
HCT VFR BLD AUTO: 49 % (ref 42–52)
HGB BLD-MCNC: 16.1 G/DL (ref 14–18)
IMM GRANULOCYTES # BLD AUTO: 0.03 K/UL (ref 0–0.11)
IMM GRANULOCYTES NFR BLD AUTO: 0.5 % (ref 0–0.9)
LYMPHOCYTES # BLD AUTO: 1.87 K/UL (ref 1–4.8)
LYMPHOCYTES NFR BLD: 31.2 % (ref 22–41)
MCH RBC QN AUTO: 29.1 PG (ref 27–33)
MCHC RBC AUTO-ENTMCNC: 32.9 G/DL (ref 33.7–35.3)
MCV RBC AUTO: 88.6 FL (ref 81.4–97.8)
MONOCYTES # BLD AUTO: 0.47 K/UL (ref 0–0.85)
MONOCYTES NFR BLD AUTO: 7.8 % (ref 0–13.4)
NEUTROPHILS # BLD AUTO: 3.31 K/UL (ref 1.82–7.42)
NEUTROPHILS NFR BLD: 55.3 % (ref 44–72)
NRBC # BLD AUTO: 0 K/UL
NRBC BLD-RTO: 0 /100 WBC
PLATELET # BLD AUTO: 195 K/UL (ref 164–446)
PMV BLD AUTO: 10 FL (ref 9–12.9)
POTASSIUM SERPL-SCNC: 4.1 MMOL/L (ref 3.6–5.5)
RBC # BLD AUTO: 5.53 M/UL (ref 4.7–6.1)
SODIUM SERPL-SCNC: 140 MMOL/L (ref 135–145)
WBC # BLD AUTO: 6 K/UL (ref 4.8–10.8)

## 2023-04-06 PROCEDURE — 85025 COMPLETE CBC W/AUTO DIFF WBC: CPT

## 2023-04-06 PROCEDURE — 80048 BASIC METABOLIC PNL TOTAL CA: CPT

## 2023-04-06 PROCEDURE — A9270 NON-COVERED ITEM OR SERVICE: HCPCS | Performed by: PHYSICIAN ASSISTANT

## 2023-04-06 PROCEDURE — RXMED WILLOW AMBULATORY MEDICATION CHARGE: Performed by: NURSE PRACTITIONER

## 2023-04-06 PROCEDURE — 700102 HCHG RX REV CODE 250 W/ 637 OVERRIDE(OP): Performed by: PHYSICIAN ASSISTANT

## 2023-04-06 RX ORDER — CELECOXIB 100 MG/1
100 CAPSULE ORAL 2 TIMES DAILY
Qty: 28 CAPSULE | Refills: 0 | Status: SHIPPED | OUTPATIENT
Start: 2023-04-06 | End: 2023-04-20

## 2023-04-06 RX ORDER — POLYETHYLENE GLYCOL 3350 17 G/17G
17 POWDER, FOR SOLUTION ORAL 2 TIMES DAILY
Refills: 3 | COMMUNITY
Start: 2023-04-06 | End: 2023-10-30

## 2023-04-06 RX ORDER — ACETAMINOPHEN 500 MG
1000 TABLET ORAL EVERY 6 HOURS
Qty: 30 TABLET | Refills: 0 | COMMUNITY
Start: 2023-04-06 | End: 2023-10-30

## 2023-04-06 RX ORDER — OXYCODONE HYDROCHLORIDE 10 MG/1
10 TABLET ORAL EVERY 4 HOURS PRN
Qty: 25 TABLET | Refills: 0 | Status: SHIPPED | OUTPATIENT
Start: 2023-04-06 | End: 2023-04-13

## 2023-04-06 RX ADMIN — CELECOXIB 100 MG: 100 CAPSULE ORAL at 05:17

## 2023-04-06 RX ADMIN — OXYCODONE HYDROCHLORIDE 10 MG: 10 TABLET ORAL at 09:33

## 2023-04-06 RX ADMIN — OXYCODONE HYDROCHLORIDE 10 MG: 10 TABLET ORAL at 01:15

## 2023-04-06 RX ADMIN — OXYCODONE HYDROCHLORIDE 10 MG: 10 TABLET ORAL at 05:17

## 2023-04-06 ASSESSMENT — PAIN DESCRIPTION - PAIN TYPE
TYPE: ACUTE PAIN;SURGICAL PAIN
TYPE: ACUTE PAIN
TYPE: ACUTE PAIN;SURGICAL PAIN

## 2023-04-06 NOTE — PROGRESS NOTES
D/c instructions given, educated on worsening s/s. Pt understands and questions answered. D/c to home with family

## 2023-04-06 NOTE — PROGRESS NOTES
Bedside report received.  Assessment complete.  A&O x 4. Patient calls appropriately.  Patient ambulates with no assist. Bed alarm off.   Patient has 7/10 pain. Pain managed with prescribed medications.  Denies N&V. Tolerating regular diet.  Surgical: MLI to abd, dressing in place. Small amount of drainage on dressing.   + void, + flatus, + BM.  Patient denies SOB.  SCD's refused.  Patient has clear breath sounds and regular heart sounds.  Review plan with of care with patient. Call light and personal belongings within reach. Hourly rounding in place. All needs met at this time.

## 2023-04-06 NOTE — PROGRESS NOTES
Bedside report received.  Assessment complete.  A&O x 4. Patient calls appropriately.  Patient ambulates with standby assist.  Patient has 6/10 pain. Pain managed with prescribed medications.  Denies N&V. Pt tolerating regular diet.  Midline incision with island dressing CDI.  + void, + flatus, - BM.  Patient denies SOB.  SCDs on.  Review plan with of care with patient. Call light and personal belongings within reach. Hourly rounding in place. All needs met at this time.

## 2023-04-06 NOTE — DISCHARGE SUMMARY
DISCHARGE  SUMMARY    DATE OF ADMISSION: 4/2/2023    DATE OF DISCHARGE: 4/6/2023    DISCHARGE DIAGNOSIS:  Small bowel obstruction    CONSULTATIONS:  None    PROCEDURES:  Procedure completed by Dr. Abdalla on 4/2/2023: Exploratory laparotomy with lysis of adhesions    BRIEF HPI and HOSPITAL COURSE:  The patient presented to the Emergency Department complaining of severe abdominal pain.  Imaging demonstrated a bowel obstruction.  He presented to the operating room for the above listed procedure.  Post operatively he transferred to the general surgical zelaya.  His diet was slowly advanced as tolerated.  On day of discharge his pain is well controlled, he is tolerating a regular diet and is having bowel movements.    DISPOSITION:   Discharged home on 4/6/2023. The patient was counseled and questions were answered. Specifically, signs and symptoms of infection, respiratory decompensation, change in bowel habits and persistent or worsening pain were discussed and the patient agrees to seek medical attention if any of these develop.    DISCHARGE MEDICATIONS:  The patients controlled substance history was reviewed and a controlled substance use informed consent (if applicable) was provided by [facility] and the patient has been prescribed.     Medication List        Start taking these medications        Instructions   acetaminophen 500 MG Tabs  Commonly known as: TYLENOL   Take 2 Tablets by mouth every 6 hours.  Dose: 1,000 mg     celecoxib 100 MG Caps  Commonly known as: CELEBREX   Take 1 Capsule by mouth 2 times a day for 14 days.  Dose: 100 mg     oxyCODONE immediate release 10 MG immediate release tablet  Commonly known as: ROXICODONE   Take 1 Tablet by mouth every four hours as needed for Severe Pain for up to 7 days.  Dose: 10 mg     polyethylene glycol/lytes 17 g Pack  Commonly known as: MIRALAX   Take 1 Packet by mouth 2 times a day.  Dose: 17 g            You will be given a prescription for  pain medication at discharge. Please take these as directed. It is important to remember not to take medications on an empty stomach as this may cause nausea.  You may also take over the counter acetaminophen and/or NSAIDS (ibuprofen, Aleve, Advil, Motrin) per the package instructions.  You may also use ice to the wound to decrease pain and swelling. You may alternate 20 minutes on and 20 minutes off with the ice for the first 24-48 hours. Make sure you place a washcloth or towel between the ice pack and your skin.  Please note that narcotic pain medication cannot be refilled unless you are seen by a doctor. Make sure you call the office if you are running low on medication or if the dose you have been prescribed is not working well for you.    ACTIVITY:  After discharge from the hospital, you may resume full routine activities; however, there should be no heavy lifting (greater than 20 pounds or a bag of groceries) and no strenuous activities for at least 2 weeks. The duration may be longer, depending on your surgical procedure. Routine activities of daily living are acceptable. Activity level should be addressed at your post-op follow up appointment. You may drive whenever you are off pain medications and are able to perform the activities needed to drive, i.e., turning, bending, twisting, etc.    WOUND CARE:  You may shower, but do not submerge in a bath for at least two weeks. If you have wound dressings, they may come off after 48 hours. If you have skin glue to the wound, this will fall off on its own, do not pick at it. If you have steri strips to the wound, these will fall off on their own, do not pick at them, may trim the edges if needed.      DIET:  Upon discharge from the hospital, you may resume your normal preoperative diet, unless specifically directed otherwise. Depending on how you are feeling and whether you have nausea or not, you may wish to stay with a bland diet for the first few days. However,  you can advance this as quickly as you feel ready.      FOLLOW UP:  [facility]  [id number]  [place] [address]  [phone]    Schedule an appointment as soon as possible for a visit in 1 week(s)  [facility] follow up clinic, for wound check and staple removal    Call the office if you have: (1) Fevers to more than 101F, (2) Unusual chest or leg pain, (3) Drainage or fluid from incision that may be foul smelling, increased tenderness or soreness at the wound or the wound edges are no longer together, redness or swelling at the incision site. Do not hesitate to call with any other questions.    TIME SPENT ON DISCHARGE: 32 minutes      ____________________________________________  GONZALEZ Ibarra    DD: 4/6/2023 9:12 AM

## 2023-04-06 NOTE — CARE PLAN
The patient is Stable - Low risk of patient condition declining or worsening    Shift Goals  Clinical Goals: pain control, BM  Patient Goals: pain control, rest    Progress made toward(s) clinical / shift goals: Pain being controlled with PRN pain medication per MAR, distraction and rest.      Problem: Knowledge Deficit - Standard  Goal: Patient and family/care givers will demonstrate understanding of plan of care, disease process/condition, diagnostic tests and medications  Outcome: Progressing     Problem: Pain - Standard  Goal: Alleviation of pain or a reduction in pain to the patient’s comfort goal  Outcome: Progressing       Patient is not progressing towards the following goals:  Pt has not had a BM yet but is passing gas.

## 2023-04-21 ENCOUNTER — PATIENT OUTREACH (OUTPATIENT)
Dept: HEALTH INFORMATION MANAGEMENT | Facility: OTHER | Age: 53
End: 2023-04-21
Payer: MEDICAID

## 2023-04-21 NOTE — PROGRESS NOTES
RACHELL Davis called patient via TC post hospital discharge and discussed up coming new PCP appointment and via texted all information. Pt states he is feeling a lot better and has no had a chance to follow up with resources provided bedside.   Will continue to follow.

## 2023-05-01 ENCOUNTER — PATIENT OUTREACH (OUTPATIENT)
Dept: HEALTH INFORMATION MANAGEMENT | Facility: OTHER | Age: 53
End: 2023-05-01
Payer: MEDICAID

## 2023-05-01 NOTE — PROGRESS NOTES
RACHELL Davis called patient via TC post hospital discharge and dicussed up coming appointment to establish with a primary care doctor. Patient received all information via text. Patient does not need transportation.  Will continue to follow.

## 2023-05-09 ENCOUNTER — PATIENT OUTREACH (OUTPATIENT)
Dept: HEALTH INFORMATION MANAGEMENT | Facility: OTHER | Age: 53
End: 2023-05-09
Payer: MEDICAID

## 2023-05-09 NOTE — PROGRESS NOTES
RACHELL Davis called patient via TC and pt states that he got the dates wrong on scheduled appointment for today. PT works nights and would like a mid day appointment.   Re scheduled missed appointment for 06/05/23 @ 1:20 pm to establish with a PCP.   Patient has no other needs at this time. Will discharge patient from [facility] and remove from case load as all goals have been met.

## 2023-10-11 ENCOUNTER — OCCUPATIONAL MEDICINE (OUTPATIENT)
Dept: URGENT CARE | Facility: CLINIC | Age: 53
End: 2023-10-11
Payer: COMMERCIAL

## 2023-10-11 VITALS
TEMPERATURE: 97.9 F | SYSTOLIC BLOOD PRESSURE: 136 MMHG | DIASTOLIC BLOOD PRESSURE: 80 MMHG | WEIGHT: 130 LBS | OXYGEN SATURATION: 99 % | HEART RATE: 86 BPM | BODY MASS INDEX: 19.26 KG/M2 | HEIGHT: 69 IN | RESPIRATION RATE: 16 BRPM

## 2023-10-11 DIAGNOSIS — S16.1XXA STRAIN OF NECK MUSCLE, INITIAL ENCOUNTER: ICD-10-CM

## 2023-10-11 DIAGNOSIS — S46.911A STRAIN OF RIGHT SHOULDER, INITIAL ENCOUNTER: ICD-10-CM

## 2023-10-11 PROCEDURE — 99213 OFFICE O/P EST LOW 20 MIN: CPT | Performed by: FAMILY MEDICINE

## 2023-10-11 PROCEDURE — 3075F SYST BP GE 130 - 139MM HG: CPT | Performed by: FAMILY MEDICINE

## 2023-10-11 PROCEDURE — 3079F DIAST BP 80-89 MM HG: CPT | Performed by: FAMILY MEDICINE

## 2023-10-11 RX ORDER — CYCLOBENZAPRINE HCL 5 MG
5-10 TABLET ORAL EVERY 8 HOURS PRN
Qty: 30 TABLET | Refills: 0 | Status: SHIPPED | OUTPATIENT
Start: 2023-10-11 | End: 2023-10-30

## 2023-10-11 ASSESSMENT — FIBROSIS 4 INDEX: FIB4 SCORE: 1.577136263945225103

## 2023-10-11 NOTE — LETTER
[address] [place] [address]  Phone:  [phone] - Fax:  [phone]   [facility] Progress Report and Disability Certification  Date of Service: 10/11/2023   No Show:  No  Date / Time of Next Visit: 10/18/2023 @ 1:00 PM   Claim Information   Patient Name: Tariq Rivera  Claim Number:     Employer: [employer]  Date of Injury: 9/4/2023     Insurer / TPA: Rick  ID / SSN:     Occupation:   Diagnosis: Diagnoses of Strain of neck muscle, initial encounter and Strain of right shoulder, initial encounter were pertinent to this visit.    Medical Information   Related to Industrial Injury? Yes    Subjective Complaints:    Neck Pain   This is a new problem.       DOI:  9/4     He was pulling on an object at work and felt acute onset of right neck, RT shoulder area.      States pain has been getting progresively worse.       Pain not improved with motrin or tylenol         The pain is present in the right side. The quality of the pain is described as achy and stiff.  The pain is moderate.  Associated symptoms include: none. Pertinent negatives include no fever, headaches, numbness, pain with swallowing or tingling.     Objective Findings:      Cervical spine : pt exhibits decreased range of motion, tenderness and spasm (right). Pt exhibits no swelling.   Rt shoulder:   + TTP over anterior deltoid.   Limited  ROM due to pain  Neurological: pt is alert and oriented to person, place, and time.    Pre-Existing Condition(s):     Assessment:   Initial Visit    Status: Additional Care Required  Permanent Disability:No    Plan: Medication    Diagnostics:      Comments:       Disability Information   Status:      From:  10/11/2023  Through: 10/18/2023 Restrictions are: Temporary   Physical Restrictions   Sitting:    Standing:    Stooping:    Bending:      Squatting:    Walking:    Climbing:    Pushing:       Pulling:    Other:    Reaching Above Shoulder (L):   Reaching Above Shoulder (R): 0 hrs/day     Reaching Below Shoulder (L):    Reaching Below Shoulder (R):  0 hrs/day   Not to exceed Weight Limits   Carrying(hrs):   Weight Limit(lb): < or = to 10 pounds Lifting(hrs):   Weight  Limit(lb): < or = to 10 pounds   Comments:    1. Strain of neck muscle, initial encounter     Restrictions per D39    - cyclobenzaprine (FLEXERIL) 5 mg tablet; Take 1-2 Tablets by mouth every 8 hours as needed for Muscle Spasms.  Dispense: 30 Tablet; Refill: 0  - diclofenac sodium (VOLTAREN) 1 % Gel; Apply 4 g topically in the morning, at noon, and at bedtime.  Dispense: 50 g; Refill: 0    2. Strain of right shoulder, initial encounter   Sling provided for comfort  - diclofenac sodium (VOLTAREN) 1 % Gel; Apply 4 g topically in the morning, at noon, and at bedtime.  Dispense: 50 g; Refill: 0    F/u in one wk        Repetitive Actions   Hands: i.e. Fine Manipulations from Grasping:     Feet: i.e. Operating Foot Controls:     Driving / Operate Machinery:     Health Care Provider’s Original or Electronic Signature  Darryl Sears M.D. Health Care Provider’s Original or Electronic Signature    Nico Anand DO MPH     Clinic Name / Location: [place] [address]  [place] [address] Clinic Phone Number: Dept: [phone]   Appointment Time: 2:30 Pm Visit Start Time: 2:55 PM   Check-In Time:  2:38 Pm Visit Discharge Time:  3:13 PM    Original-Treating Physician or Chiropractor    Page 2-Insurer/TPA    Page 3-Employer    Page 4-Employee

## 2023-10-11 NOTE — LETTER
[address] [facility] [address] [place] [address]  Phone:  [phone] - Fax:  [phone]   [facility] Progress Report and Disability Certification  Date of Service: 10/11/2023   No Show:  No  Date / Time of Next Visit: 10/18/2023   Claim Information   Patient Name: Tariq Rivera  Claim Number:     Employer: [employer]  Date of Injury: 9/4/2023     Insurer / TPA: Rick  ID / SSN:     Occupation:   Diagnosis: Diagnoses of Strain of neck muscle, initial encounter and Strain of right shoulder, initial encounter were pertinent to this visit.    Medical Information   Related to Industrial Injury? Yes    Subjective Complaints:    Neck Pain   This is a new problem.       DOI:  9/4     He was pulling on an object at work and felt acute onset of right neck, RT shoulder area.      States pain has been getting progresively worse.       Pain not improved with motrin or tylenol         The pain is present in the right side. The quality of the pain is described as achy and stiff.  The pain is moderate.  Associated symptoms include: none. Pertinent negatives include no fever, headaches, numbness, pain with swallowing or tingling.     Objective Findings:      Cervical spine : pt exhibits decreased range of motion, tenderness and spasm (right). Pt exhibits no swelling.   Rt shoulder:   + TTP over anterior deltoid.   Limited  ROM due to pain  Neurological: pt is alert and oriented to person, place, and time.    Pre-Existing Condition(s):     Assessment:   Initial Visit    Status: Additional Care Required  Permanent Disability:No    Plan: Medication    Diagnostics:      Comments:       Disability Information   Status:      From:  10/11/2023  Through: 10/18/2023 Restrictions are: Temporary   Physical Restrictions   Sitting:    Standing:    Stooping:    Bending:      Squatting:    Walking:    Climbing:    Pushing:      Pulling:     Other:    Reaching Above Shoulder (L):   Reaching Above Shoulder (R): 0 hrs/day     Reaching Below Shoulder (L):    Reaching Below Shoulder (R):  0 hrs/day   Not to exceed Weight Limits   Carrying(hrs):   Weight Limit(lb): < or = to 10 pounds Lifting(hrs):   Weight  Limit(lb): < or = to 10 pounds   Comments:    1. Strain of neck muscle, initial encounter     Restrictions per D39    - cyclobenzaprine (FLEXERIL) 5 mg tablet; Take 1-2 Tablets by mouth every 8 hours as needed for Muscle Spasms.  Dispense: 30 Tablet; Refill: 0  - diclofenac sodium (VOLTAREN) 1 % Gel; Apply 4 g topically in the morning, at noon, and at bedtime.  Dispense: 50 g; Refill: 0    2. Strain of right shoulder, initial encounter   Sling provided for comfort  - diclofenac sodium (VOLTAREN) 1 % Gel; Apply 4 g topically in the morning, at noon, and at bedtime.  Dispense: 50 g; Refill: 0    F/u in one wk        Repetitive Actions   Hands: i.e. Fine Manipulations from Grasping:     Feet: i.e. Operating Foot Controls:     Driving / Operate Machinery:     Health Care Provider’s Original or Electronic Signature  Darryl Sears M.D. Health Care Provider’s Original or Electronic Signature    Nico Anand DO MPH     Clinic Name / Location: [place] [address]  [place] [address] Clinic Phone Number: Dept: [phone]   Appointment Time: 2:30 Pm Visit Start Time: 2:55 PM   Check-In Time:  2:38 Pm Visit Discharge Time:     Original-Treating Physician or Chiropractor    Page 2-Insurer/TPA    Page 3-Employer    Page 4-Employee

## 2023-10-11 NOTE — LETTER
"EMPLOYEE’S CLAIM FOR COMPENSATION/ REPORT OF INITIAL TREATMENT  FORM C-4  PLEASE TYPE OR PRINT    EMPLOYEE’S CLAIM - PROVIDE ALL INFORMATION REQUESTED   First Name  Tariq Last Name  Miguel Birthdate                    1970                Sex  male Claim Number (Insurer’s Use Only)   Home Address  [address] Age  53 y.o. Height  1.753 m (5' 9\") Weight  59 kg (130 lb) [facility]     [facility] Zip  [address] Telephone  [phone] (home)    Mailing Address  [address] [facility] Zip  [address] Primary Language Spoken  English    INSURER   THIRD-PARTY     [employer]   Employee's Occupation (Job Title) When Injury or Occupational Disease Occurred      Employer's Name/Company Name  [employer]  Telephone  [phone]    Office Mail Address (Number and Street)  [address]        Date of Injury  9/4/2023               Hours Injury  10:00 PM Date Employer Notified   Last Day of Work after Injury or Occupational Disease  10/10/2023 Supervisor to Whom Injury     Reported  ADRYAN   Address or Location of Accident (if applicable)  Work [1]   What were you doing at the time of accident? (if applicable)  gRAbbing mAteRiAL    How did this injury or occupational disease occur? (Be specific and answer in detail. Use additional sheet if necessary)  Pulling on A donstAR   If you believe that you have an occupational disease, when did you first have knowledge of the disability and its relationship to your employment?  n/a Witnesses to the Accident (if applicable)  NARSET      Nature of Injury or Occupational Disease  Defer  Part(s) of Body Injured or Affected  Soft Tissue - Neck, Shoulder (R), N/A    I CERTIFY THAT THE ABOVE IS TRUE AND CORRECT TO T HE BEST OF MY KNOWLEDGE AND THAT I HAVE PROVIDED THIS INFORMATION IN ORDER TO OBTAIN THE BENEFITS OF [place], OR [place]).  I HEREBY AUTHORIZE ANY PHYSICIAN, CHIROPRACTOR, SURGEON, PRACTITIONER OR ANY OTHER PERSON, ANY HOSPITAL, INCLUDING [facility] OR [facility], ANY  MEDICAL SERVICE ORGANIZATION, ANY INSURANCE COMPANY, OR OTHER INSTITUTION OR ORGANIZATION TO RELEASE TO EACH OTHER, ANY MEDICAL OR OTHER INFORMATION, INCLUDING BENEFITS PAID OR PAYABLE, PERTINENT TO THIS INJURY OR DISEASE, EXCEPT INFORMATION RELATIVE TO DIAGNOSIS, TREATMENT AND/OR COUNSELING FOR AIDS, PSYCHOLOGICAL CONDITIONS, ALCOHOL OR CONTROLLED SUBSTANCES, FOR WHICH I MUST GIVE SPECIFIC AUTHORIZATION.  A PHOTOSTAT OF THIS AUTHORIZATION SHALL BE VALID AS THE ORIGINAL.        Date 10/11/2023    [facility]  Employee’s Original or  *Electronic Signature   THIS REPORT MUST BE COMPLETED AND MAILED WITHIN 3 WORKING DAYS OF TREATMENT   Place  [facility]  Name of Facility  [facility]   Date  10/11/2023 Diagnosis and Description of Injury or Occupational Disease  (S16.1XXA) Strain of neck muscle, initial encounter  (S46.911A) Strain of right shoulder, initial encounter Is there evidence that the injured employee was under the influence of alcohol and/or another controlled substance at the time of accident?  ? No ? Yes (if yes, please explain)   Hour  2:55 PM   Diagnoses of Strain of neck muscle, initial encounter and Strain of right shoulder, initial encounter were pertinent to this visit. No   Treatment     1. Strain of neck muscle, initial encounter     Restrictions per D39    - cyclobenzaprine (FLEXERIL) 5 mg tablet; Take 1-2 Tablets by mouth every 8 hours as needed for Muscle Spasms.  Dispense: 30 Tablet; Refill: 0  - diclofenac sodium (VOLTAREN) 1 % Gel; Apply 4 g topically in the morning, at noon, and at bedtime.  Dispense: 50 g; Refill: 0    2. Strain of right shoulder, initial encounter   Sling provided for comfort  - diclofenac sodium  (VOLTAREN) 1 % Gel; Apply 4 g topically in the morning, at noon, and at bedtime.  Dispense: 50 g; Refill: 0    F/u in one wk      Have you advised the patient to remain off work five days or     more?    X-Ray Findings      ? Yes Indicate dates:   From   To      From information given by the employee, together with medical evidence, can        you directly connect this injury or occupational disease as job incurred?  Yes ? No If no, is the injured employee capable of:  ? full duty  No ? modified duty  Yes   Is additional medical care by a physician indicated?  Yes If modified duty, specify any limitations / restrictions  Restrictions per D39     Do you know of any previous injury or disease contributing to this condition or occupational disease?  ? Yes ? No (Explain if yes)                          No   Date  10/11/2023 Print Health Care Provider's  Name  Darryl Sears M.D. I certify that the employer’s copy of  this form was delivered to the employer on:   Address  [address] [place] [address] Insurer’s Use Only     [facility] Zip  [address]    Provider’s Tax ID Number  [id number] Telephone  Dept: [phone]             Health Care Provider’s Original or Electronic Signature  e-DARRYL Loera M.D. Degree (MD,DO, DC,PA-C,APRN)  MD      * Complete and attach Release of Information (Form C-4A) when injured employee signs C-4 Form electronically  ORIGINAL - TREATING HEALTHCARE PROVIDER PAGE 2 - INSURER/TPA PAGE 3 - EMPLOYER PAGE 4 - EMPLOYEE             Form C-4 (rev.08/21)           BRIEF DESCRIPTION OF RIGHTS AND BENEFITS  (Pursuant to [place] 616[place].050)    Notice of Injury or Occupational Disease (Incident Report Form C-1): If an injury or occupational disease (OD) arises out of and in the course of employment, you must provide written notice to your employer as soon as practicable, but no later than 7 days after the accident or OD. Your employer shall maintain a sufficient supply of the required  "forms.    Claim for Compensation (Form C-4): If medical treatment is sought, the form C-4 is available at the place of initial treatment. A completed \"Claim for Compensation\" (Form C-4) must be filed within 90 days after an accident or OD. The treating physician or chiropractor must, within 3 working days after treatment, complete and mail to the employer, the employer's insurer and third-party , the Claim for Compensation.    Medical Treatment: If you require medical treatment for your on-the-job injury or OD, you may be required to select a physician or chiropractor from a list provided by your workers’ compensation insurer, if it has contracted with an Organization for Managed Care (MCO) or Preferred Provider Organization (PPO) or providers of health care. If your employer has not entered into a contract with an MCO or PPO, you may select a physician or chiropractor from the Panel of Physicians and Chiropractors. Any medical costs related to your industrial injury or OD will be paid by your insurer.    Temporary Total Disability (TTD): If your doctor has certified that you are unable to work for a period of at least 5 consecutive days, or 5 cumulative days in a 20-day period, or places restrictions on you that your employer does not accommodate, you may be entitled to TTD compensation.    Temporary Partial Disability (TPD): If the wage you receive upon reemployment is less than the compensation for TTD to which you are entitled, the insurer may be required to pay you TPD compensation to make up the difference. TPD can only be paid for a maximum of 24 months.    Permanent Partial Disability (PPD): When your medical condition is stable and there is an indication of a PPD as a result of your injury or OD, within 30 days, your insurer must arrange for an evaluation by a rating physician or chiropractor to determine the degree of your PPD. The amount of your PPD award depends on the date of injury, the " results of the PPD evaluation, your age and wage.    Permanent Total Disability (PTD): If you are medically certified by a treating physician or chiropractor as permanently and totally disabled and have been granted a PTD status by your insurer, you are entitled to receive monthly benefits not to exceed 66 2/3% of your average monthly wage. The amount of your PTD payments is subject to reduction if you previously received a lump-sum PPD award.    Vocational Rehabilitation Services: You may be eligible for vocational rehabilitation services if you are unable to return to the job due to a permanent physical impairment or permanent restrictions as a result of your injury or occupational disease.    Transportation and Per Jose Reimbursement: You may be eligible for travel expenses and per jose associated with medical treatment.    Reopening: You may be able to reopen your claim if your condition worsens after claim closure.     Appeal Process: If you disagree with a written determination issued by the insurer or the insurer does not respond to your request, you may appeal to the Department of Administration, , by following the instructions contained in your determination letter. You must appeal the determination within 70 days from the date of the determination letter at [address], [address], [place], [place] [address], or [address], [place] [address]. If you disagree with the  decision, you may appeal to the Department of Administration, . You must file your appeal within 30 days from the date of the  decision letter at [address], [address], [place], [place] [address], or [address], [place], [place] [address]. If you disagree with a decision of an , you may file a petition for judicial review with the District Court. You must do so within 30 days of the Appeal Officer’s  decision. You may be represented by an  at your own expense or you may contact the [facility] for possible representation.    [place]  for Injured Workers (NAIW): If you disagree with a  decision, you may request that NAIW represent you without charge at an  Hearing. For information regarding denial of benefits, you may contact the [facility] at: [address] [facility], [address], [place] [address], [phone], or [address], [place] [address], [phone]    To File a Complaint with the Division: If you wish to file a complaint with the  of the Division of Industrial Relations (DIR),  please contact the Workers’ Compensation Section, [address], [address], [place], [place] [address], telephone [phone], or [address], [place], [place] [address], telephone [phone].    For assistance with Workers’ Compensation Issues: You may contact the [facility] Office for Consumer Health Assistance, [address], [address], [place], [place] [address], Toll Free 1-767.268.8342, Web site: http://[facility].[place].gov/Programs/ANICETO E-mail: aniceto@[email or web address].[email or web address]              __________________________________________________________________                                    _________________            Employee Name / Signature                                                                                                                            Date                                                                                                                                                                                                                              D-2 (rev. 10/20)

## 2023-10-11 NOTE — PROGRESS NOTES
"Subjective:      Chief Complaint   Patient presents with    Shoulder Injury      NEW, R shoulder, neck pain                 Subjective:      Chief Complaint   Patient presents with    Shoulder Injury     WC NEW, R shoulder, neck pain                  Neck Pain   This is a new problem.       DOI:  9/4     He was pulling on an object at work and felt acute onset of right neck, RT shoulder area.      States pain has been getting progresively worse.       Pain not improved with motrin or tylenol         The pain is present in the right side. The quality of the pain is described as achy and stiff.  The pain is moderate.  Associated symptoms include: none. Pertinent negatives include no fever, headaches, numbness, pain with swallowing or tingling.        Past Medical History:   Diagnosis Date    Asthma     Hemorrhoids, external 11/11/2013         Social History     Tobacco Use    Smoking status: Every Day     Current packs/day: 0.50     Types: Cigarettes    Smokeless tobacco: Never   Vaping Use    Vaping Use: Never used   Substance Use Topics    Alcohol use: Yes    Drug use: Yes     Types: Inhaled     Comment: marijuana         Family hx was reviewed - no pertinent past family hx        Review of Systems   Constitutional: Negative for fever, chills and weight loss.   HENT - denies cough, ear pain, congestion, sore throat  Eyes: denies vision changes, discharge  Respiratory: Negative for cough and wheezing.    Cardiovascular: Negative for chest pain or PND.   Gastrointestinal:  No abdominal pain,  nausea, vomiting, diarrhea.  Negative for  blood in stool.     Neurological:  . Negative for tingling, weakness, numbnes.   musculoskeletal - denies myalgias, calf pain  Psych - denies anxiety/depression/mood changes.  Skin: no itching or rash  All other systems reviewed and are negative.           Objective:   /80   Pulse 86   Temp 36.6 °C (97.9 °F)   Resp 16   Ht 1.753 m (5' 9\")   Wt 59 kg (130 lb)   SpO2 99% "         Physical Exam   Constitutional: pt is oriented to person, place, and time. Pt appears well-developed and well-nourished. No distress.   HENT:   Head: Normocephalic and atraumatic.   Eyes: Conjunctivae are normal.   Cardiovascular: Normal rate and regular rhythm.    Pulmonary/Chest: Effort normal and breath sounds normal. No respiratory distress. Pt has no wheezes.   Musculoskeletal:        Cervical spine : pt exhibits decreased range of motion, tenderness and spasm (right). Pt exhibits no swelling.   Rt shoulder:   + TTP over anterior deltoid.   Limited  ROM due to pain  Neurological: pt is alert and oriented to person, place, and time.      Skin: Skin is warm. Pt is not diaphoretic. No erythema.   Psychiatric:  Pt's behavior is normal.   Nursing note and vitals reviewed.          A/P:     1. Strain of neck muscle, initial encounter     Restrictions per D39    - cyclobenzaprine (FLEXERIL) 5 mg tablet; Take 1-2 Tablets by mouth every 8 hours as needed for Muscle Spasms.  Dispense: 30 Tablet; Refill: 0  - diclofenac sodium (VOLTAREN) 1 % Gel; Apply 4 g topically in the morning, at noon, and at bedtime.  Dispense: 50 g; Refill: 0    2. Strain of right shoulder, initial encounter   Sling provided for comfort  - diclofenac sodium (VOLTAREN) 1 % Gel; Apply 4 g topically in the morning, at noon, and at bedtime.  Dispense: 50 g; Refill: 0    F/u in one wk

## 2023-10-19 ENCOUNTER — OCCUPATIONAL MEDICINE (OUTPATIENT)
Dept: URGENT CARE | Facility: CLINIC | Age: 53
End: 2023-10-19
Payer: COMMERCIAL

## 2023-10-19 VITALS
DIASTOLIC BLOOD PRESSURE: 80 MMHG | HEIGHT: 69 IN | HEART RATE: 76 BPM | OXYGEN SATURATION: 98 % | WEIGHT: 142.9 LBS | SYSTOLIC BLOOD PRESSURE: 124 MMHG | TEMPERATURE: 98.1 F | BODY MASS INDEX: 21.17 KG/M2

## 2023-10-19 DIAGNOSIS — S46.911D STRAIN OF RIGHT SHOULDER, SUBSEQUENT ENCOUNTER: ICD-10-CM

## 2023-10-19 DIAGNOSIS — S16.1XXD STRAIN OF NECK MUSCLE, SUBSEQUENT ENCOUNTER: ICD-10-CM

## 2023-10-19 PROCEDURE — 3074F SYST BP LT 130 MM HG: CPT | Performed by: PHYSICIAN ASSISTANT

## 2023-10-19 PROCEDURE — 99213 OFFICE O/P EST LOW 20 MIN: CPT | Performed by: PHYSICIAN ASSISTANT

## 2023-10-19 PROCEDURE — 3079F DIAST BP 80-89 MM HG: CPT | Performed by: PHYSICIAN ASSISTANT

## 2023-10-19 RX ORDER — METHYLPREDNISOLONE 4 MG/1
TABLET ORAL
Qty: 1 EACH | Refills: 0 | Status: SHIPPED | OUTPATIENT
Start: 2023-10-19 | End: 2023-10-30

## 2023-10-19 ASSESSMENT — FIBROSIS 4 INDEX: FIB4 SCORE: 1.577136263945225103

## 2023-10-19 NOTE — LETTER
[facility] [place] [address] [facility] [address] [place] [address]  Phone:  [phone] - Fax:  [phone]   [facility] Progress Report and Disability Certification  Date of Service: 10/19/2023   No Show:  No  Date / Time of Next Visit: 10/26/2023   Claim Information   Patient Name: Tariq Rivera  Claim Number:     Employer: [employer]  Date of Injury: 9/4/2023     Insurer / TPA: Rick  ID / SSN:     Occupation:   Diagnosis: Diagnoses of Strain of neck muscle, subsequent encounter and Strain of right shoulder, subsequent encounter were pertinent to this visit.    Medical Information   Related to Industrial Injury? Yes    Subjective Complaints:  DOI: 9/4/23 - Pt notes injury to right shoulder and neck while pulling back on object.  He reports no change in pain to right neck/shoulder since last evaluation.  Patient acknowledges he has not been able to  prescription but plans to do so tomorrow.  Denies numbness tingling or weakness.  Describes pain that can radiate from right shoulder over trapezius to right side of neck.  Patient denies history of shoulder or neck injuries or surgeries.  Patient has tried OTC anti-inflammatories with minimal improvement.   Objective Findings: Gen: AOx3; Head: NC AT; Eyes: PERRLA/EOM; Lungs: NLR; Cardiac: RR by periph pulse exam; neck: Grossly normal no erythema ecchymosis or edema, near full active range of motion with some lacking in rotation, no bony tenderness to palpation or deformity, right trapezius tenderness to palpation; right shoulder: Grossly normal with no erythema ecchymosis or effusion, full active range of motion, normal rotator cuff strength, diffuse tenderness to palpation over right trapezius; neuro: NVID, normal sensation to light touch, interosseous strength 5 out of 5, +2 radial pulses   Pre-Existing Condition(s):     Assessment:   Condition Same    Status: Additional Care  Required  Permanent Disability:No    Plan:   Comments:Restricted duty, 10 pound weight restriction right upper extremity, limit reaching with right upper extremity, Rx Medrol, Rx Flexeril (for not at work) and Voltaren sent to pharmacy, follow-up in 1 week with lack of improvement recommend occ med    Diagnostics:      Comments:       Disability Information   Status: Released to Restricted Duty    From:  10/19/2023  Through: 10/26/2023 Restrictions are: Temporary   Physical Restrictions   Sitting:    Standing:    Stooping:    Bending:      Squatting:    Walking:    Climbing:    Pushing:      Pulling:    Other:    Reaching Above Shoulder (L):   Reaching Above Shoulder (R): 0 hrs/day     Reaching Below Shoulder (L):    Reaching Below Shoulder (R):  0 hrs/day   Not to exceed Weight Limits   Carrying(hrs):   Weight Limit(lb): < or = to 10 pounds  Comments:RUE Lifting(hrs):   Weight  Limit(lb): < or = to 10 pounds  Comments:RUE   Comments: Restricted duty, 10 pound weight restriction right upper extremity, limit reaching with right upper extremity, Rx Medrol, Rx Flexeril (for not at work) and Voltaren sent to pharmacy, follow-up in 1 week with lack of improvement recommend occ med     Repetitive Actions   Hands: i.e. Fine Manipulations from Grasping:     Feet: i.e. Operating Foot Controls:     Driving / Operate Machinery:     Health Care Provider’s Original or Electronic Signature  NARINDER RazaAMarly-MICHELE. Health Care Provider’s Original or Electronic Signature    Nico Anand DO MPH     Clinic Name / Location: [address] [place] [address] [place] [address] Clinic Phone Number: Dept: [phone]   Appointment Time: 11:45 Am Visit Start Time: 12:03 PM   Check-In Time:  11:40 Am Visit Discharge Time:  1:09 PM   Original-Treating Physician or Chiropractor    Page 2-Insurer/TPA    Page 3-Employer    Page 4-Employee

## 2023-10-19 NOTE — PROGRESS NOTES
"Subjective:     Tariq Rivera is a 53 y.o. male who presents for Follow-Up (WC FV DOI: 9/4/23 neck, shoulder pain, \"feels like it will pop\")      DOI: 9/4/23 - Pt notes injury to right shoulder and neck while pulling back on object.  He reports no change in pain to right neck/shoulder since last evaluation.  Patient acknowledges he has not been able to  prescription but plans to do so tomorrow.  Denies numbness tingling or weakness.  Describes pain that can radiate from right shoulder over trapezius to right side of neck.  Patient denies history of shoulder or neck injuries or surgeries.  Patient has tried OTC anti-inflammatories with minimal improvement.    PMH:   No pertinent past medical history to this problem  MEDS:  Medications were reviewed in EMR  ALLERGIES:  Allergies were reviewed in EMR  FH:   No pertinent family history to this problem       Objective:     /80 (BP Location: Right arm, Patient Position: Sitting, BP Cuff Size: Adult)   Pulse 76   Temp 36.7 °C (98.1 °F) (Temporal)   Ht 1.753 m (5' 9\")   Wt 64.8 kg (142 lb 14.4 oz)   SpO2 98%   BMI 21.10 kg/m²     Gen: AOx3; Head: NC AT; Eyes: PERRLA/EOM; Lungs: NLR; Cardiac: RR by periph pulse exam; neck: Grossly normal no erythema ecchymosis or edema, near full active range of motion with some lacking in rotation, no bony tenderness to palpation or deformity, right trapezius tenderness to palpation; right shoulder: Grossly normal with no erythema ecchymosis or effusion, full active range of motion, normal rotator cuff strength, diffuse tenderness to palpation over right trapezius; neuro: NVID, normal sensation to light touch, interosseous strength 5 out of 5, +2 radial pulses    Assessment/Plan:       1. Strain of neck muscle, subsequent encounter  - methylPREDNISolone (MEDROL DOSEPAK) 4 MG Tablet Therapy Pack; Take as directed on package.  Dispense one package.  Dispense: 1 Each; Refill: 0    2. Strain of right shoulder, subsequent " encounter  - methylPREDNISolone (MEDROL DOSEPAK) 4 MG Tablet Therapy Pack; Take as directed on package.  Dispense one package.  Dispense: 1 Each; Refill: 0    Released to Restricted Duty FROM 10/19/2023 TO 10/26/2023  Restricted duty, 10 pound weight restriction right upper extremity, limit reaching with right upper extremity, Rx Medrol, Rx Flexeril (for not at work) and Voltaren sent to pharmacy, follow-up in 1 week with lack of improvement recommend occ med        Differential diagnosis, natural history, supportive care, and indications for immediate follow-up discussed.

## 2023-10-30 ENCOUNTER — OCCUPATIONAL MEDICINE (OUTPATIENT)
Dept: URGENT CARE | Facility: CLINIC | Age: 53
End: 2023-10-30
Payer: COMMERCIAL

## 2023-10-30 VITALS
HEART RATE: 70 BPM | OXYGEN SATURATION: 97 % | HEIGHT: 69 IN | RESPIRATION RATE: 12 BRPM | TEMPERATURE: 98.4 F | SYSTOLIC BLOOD PRESSURE: 124 MMHG | WEIGHT: 144 LBS | BODY MASS INDEX: 21.33 KG/M2 | DIASTOLIC BLOOD PRESSURE: 88 MMHG

## 2023-10-30 DIAGNOSIS — S46.911A STRAIN OF RIGHT SHOULDER, INITIAL ENCOUNTER: ICD-10-CM

## 2023-10-30 DIAGNOSIS — S16.1XXD STRAIN OF NECK MUSCLE, SUBSEQUENT ENCOUNTER: ICD-10-CM

## 2023-10-30 PROCEDURE — 3079F DIAST BP 80-89 MM HG: CPT | Performed by: FAMILY MEDICINE

## 2023-10-30 PROCEDURE — 3074F SYST BP LT 130 MM HG: CPT | Performed by: FAMILY MEDICINE

## 2023-10-30 PROCEDURE — 99213 OFFICE O/P EST LOW 20 MIN: CPT | Performed by: FAMILY MEDICINE

## 2023-10-30 ASSESSMENT — FIBROSIS 4 INDEX: FIB4 SCORE: 1.577136263945225103

## 2023-10-30 NOTE — LETTER
[facility] [address] [place] [address]  Phone:  [phone] - Fax:  [phone]   [facility] Progress Report and Disability Certification  Date of Service: 10/30/2023   No Show:  No  Date / Time of Next Visit: 11/20/2023 Occupational medicine     Claim Information   Patient Name: Tariq Rivera  Claim Number:     Employer: [employer]  Date of Injury: 9/4/2023     Insurer / TPA: Rick  ID / SSN:     Occupation:   Diagnosis: Diagnoses of Strain of neck muscle, subsequent encounter and Strain of right shoulder, initial encounter were pertinent to this visit.    Medical Information   Related to Industrial Injury? Yes    Subjective Complaints:  DOI: 9/4/2023.   F/u visit right neck and shoulder strain. GEORGETTE: pulling/jerking forcefully on [employer]star machine.   Overall only 15% improved. Thinks swelling slightly better after steroids. Now feel pain to upper back but symptoms remain primarily trapezius. Few days ago felt pop with right head rotation. Right hand dominant. No prior injury. No fever. No myelopathy symptoms. No other aggravating or alleviating factors.     Objective Findings: Neck: no midline tenderness or deformity. Tender right trapezius. R head rotation limited to 45 degrees with pain.   Neuro: =   Pre-Existing Condition(s):     Assessment:   Condition Improved    Status: Additional Care Required  Permanent Disability:No    Plan:   Comments:initiate PT, transfer care to occupational medicine    Diagnostics:      Comments:       Disability Information   Status: Released to Restricted Duty    From:  10/30/2023  Through: 11/20/2023 Restrictions are: Temporary   Physical Restrictions   Sitting:    Standing:    Stooping:    Bending:      Squatting:    Walking:    Climbing:    Pushing:      Pulling:    Other:    Reaching Above Shoulder (L):   Reaching Above Shoulder (R): 0 hrs/day     Reaching Below  Shoulder (L):    Reaching Below Shoulder (R):      Not to exceed Weight Limits   Carrying(hrs):   Weight Limit(lb): < or = to 25 pounds Lifting(hrs):   Weight  Limit(lb): < or = to 25 pounds   Comments:      Repetitive Actions   Hands: i.e. Fine Manipulations from Grasping:     Feet: i.e. Operating Foot Controls:     Driving / Operate Machinery:     Health Care Provider’s Original or Electronic Signature  Jordan Morrell M.D. Health Care Provider’s Original or Electronic Signature    Nico Anand DO MPH     Clinic Name / Location: [facility] [place] [address]  [place] [address] Clinic Phone Number: Dept: [phone]   Appointment Time: 11:30 Am Visit Start Time: 11:39 AM   Check-In Time:  11:30 Am Visit Discharge Time:  12:39 PM   Original-Treating Physician or Chiropractor    Page 2-Insurer/TPA    Page 3-Employer    Page 4-Employee

## 2023-11-01 NOTE — PROGRESS NOTES
"Subjective     Tariq Rivera is a 53 y.o. male who presents with Follow-Up (FV DOI 09.04.2023 - NECK/ SHOULDER R. Patient states that he feels the same. States his neck popped this weekend. States his whole right side does not feel right )      DOI: 9/4/2023.   F/u visit right neck and shoulder strain. GEORGETTE: pulling/jerking forcefully on Donstar machine.   Overall only 15% improved. Thinks swelling slightly better after steroids. Now feel pain to upper back but symptoms remain primarily trapezius. Few days ago felt pop with right head rotation. Right hand dominant. No prior injury. No fever. No myelopathy symptoms. No other aggravating or alleviating factors.       HPI    ROS           Objective     /88 (BP Location: Left arm, Patient Position: Sitting, BP Cuff Size: Adult)   Pulse 70   Temp 36.9 °C (98.4 °F) (Temporal)   Resp 12   Ht 1.753 m (5' 9\")   Wt 65.3 kg (144 lb)   SpO2 97%   BMI 21.27 kg/m²      Physical Exam    Neck: no midline tenderness or deformity. Tender right trapezius. R head rotation limited to 45 degrees with pain.   Neuro: =                   Assessment & Plan   Urgent care visit 10/19/2023 reviewed     1. Strain of neck muscle, subsequent encounter    - Referral to Physical Therapy  - Referral to Occupational Medicine    2. Strain of right shoulder, initial encounter    - Referral to Physical Therapy  - Referral to Occupational Medicine    Tariq is not making improvements as would be expected for simple strain.  At this point will initiate physical therapy and transfer care to occupational medicine.    Light duty restrictions on D39.  OTC NSAID as needed                "

## 2023-11-07 ENCOUNTER — HOSPITAL ENCOUNTER (EMERGENCY)
Facility: MEDICAL CENTER | Age: 53
End: 2023-11-07
Attending: EMERGENCY MEDICINE
Payer: MEDICAID

## 2023-11-07 ENCOUNTER — APPOINTMENT (OUTPATIENT)
Dept: RADIOLOGY | Facility: MEDICAL CENTER | Age: 53
End: 2023-11-07
Attending: EMERGENCY MEDICINE
Payer: MEDICAID

## 2023-11-07 ENCOUNTER — APPOINTMENT (OUTPATIENT)
Dept: URGENT CARE | Facility: CLINIC | Age: 53
End: 2023-11-07
Payer: COMMERCIAL

## 2023-11-07 VITALS
HEART RATE: 61 BPM | RESPIRATION RATE: 16 BRPM | WEIGHT: 145.5 LBS | TEMPERATURE: 98.7 F | BODY MASS INDEX: 21.49 KG/M2 | OXYGEN SATURATION: 99 % | SYSTOLIC BLOOD PRESSURE: 151 MMHG | DIASTOLIC BLOOD PRESSURE: 100 MMHG

## 2023-11-07 DIAGNOSIS — S16.1XXA STRAIN OF NECK MUSCLE, INITIAL ENCOUNTER: ICD-10-CM

## 2023-11-07 PROCEDURE — 99284 EMERGENCY DEPT VISIT MOD MDM: CPT

## 2023-11-07 PROCEDURE — 700111 HCHG RX REV CODE 636 W/ 250 OVERRIDE (IP): Mod: UD | Performed by: EMERGENCY MEDICINE

## 2023-11-07 PROCEDURE — 72040 X-RAY EXAM NECK SPINE 2-3 VW: CPT

## 2023-11-07 PROCEDURE — 96372 THER/PROPH/DIAG INJ SC/IM: CPT

## 2023-11-07 RX ORDER — METHYLPREDNISOLONE 4 MG/1
TABLET ORAL
Qty: 21 TABLET | Refills: 0 | Status: SHIPPED | OUTPATIENT
Start: 2023-11-07

## 2023-11-07 RX ORDER — HYDROCODONE BITARTRATE AND ACETAMINOPHEN 5; 325 MG/1; MG/1
1 TABLET ORAL EVERY 6 HOURS PRN
Qty: 10 TABLET | Refills: 0 | Status: SHIPPED | OUTPATIENT
Start: 2023-11-07 | End: 2023-11-10

## 2023-11-07 RX ORDER — KETOROLAC TROMETHAMINE 30 MG/ML
15 INJECTION, SOLUTION INTRAMUSCULAR; INTRAVENOUS ONCE
Status: COMPLETED | OUTPATIENT
Start: 2023-11-07 | End: 2023-11-07

## 2023-11-07 RX ADMIN — KETOROLAC TROMETHAMINE 15 MG: 30 INJECTION, SOLUTION INTRAMUSCULAR; INTRAVENOUS at 11:27

## 2023-11-07 ASSESSMENT — FIBROSIS 4 INDEX: FIB4 SCORE: 1.577136263945225103

## 2023-11-07 ASSESSMENT — PAIN DESCRIPTION - DESCRIPTORS: DESCRIPTORS: STABBING

## 2023-11-07 NOTE — LETTER
[facility], EMERGENCY DEPT   [address], [place] [address]  Phone: Dept: [phone] - Fax:        [facility] Progress Report and Disability Certification  Date of Service: 11/7/2023   No Show:  No  Date / Time of Next Visit:     Claim Information   Patient Name: Tariq Rivera  Claim Number:     Employer:    Date of Injury:      Insurer / TPA: [employer] ID / SSN: [id number]    Occupation:  Diagnosis: The encounter diagnosis was Strain of neck muscle, initial encounter.    Medical Information   Related to Industrial Injury? Yes    Subjective Complaints:  Still having continued pain within the right side of the neck and right back.   Objective Findings: She does have a muscular spasm in the right paraspinous musculature into the right shoulder as well as into the right back.   Pre-Existing Condition(s):     Assessment:   Initial Visit    Status: Additional Care Required  Permanent Disability:No    Plan: MedicationTransfer Care    Diagnostics: X-ray    Comments:  Patient is to continue following up with occupational health for continued care and follow-up with physical therapy as referred and specialist care as needed.    Disability Information   Status:   Comments:Prior disability is to be continued.    From:     Through:   Restrictions are:     Physical Restrictions   Sitting:    Standing:    Stooping:    Bending:      Squatting:    Walking:    Climbing:    Pushing:      Pulling:    Other:    Reaching Above Shoulder (L):   Reaching Above Shoulder (R):       Reaching Below Shoulder (L):    Reaching Below Shoulder (R):      Not to exceed Weight Limits   Carrying(hrs):   Weight Limit(lb):   Lifting(hrs):   Weight  Limit(lb):     Comments:      Repetitive Actions   Hands: i.e. Fine Manipulations from Grasping:     Feet: i.e. Operating Foot Controls:     Driving / Operate Machinery:     Physician Name: Jhon Moore Physician Signature:  fabrizio-MARINA Kuhn M.D. e-Signature:  , Medical Director   Clinic Name / Location: [facility], EMERGENCY DEPT  [address] [place] [address]  [phone]     Clinic Phone Number: Dept: [phone]   Appointment Time:  Visit Start Time:    Check-In Time:  10:06 AM Visit Discharge Time:    Original-Treating Physician or Chiropractor    Page 2-Insurer/TPA    Page 3-Employer    Page 4-Employee

## 2023-11-07 NOTE — LETTER
[facility], EMERGENCY DEPT   [address], [place] [address]  Phone: Dept: [phone] - Fax:        [facility] Progress Report and Disability Certification  Date of Service: 11/7/2023   No Show:  No  Date / Time of Next Visit:     Claim Information   Patient Name: Tariq Rivera  Claim Number:     Employer:    Date of Injury:      Insurer / TPA: [employer] ID / SSN: [id number]    Occupation:  Diagnosis: The encounter diagnosis was Strain of neck muscle, initial encounter.    Medical Information   Related to Industrial Injury? Yes ***   Subjective Complaints:  Still having continued pain within the right side of the neck and right back.   Objective Findings: She does have a muscular spasm in the right paraspinous musculature into the right shoulder as well as into the right back.   Pre-Existing Condition(s):     Assessment:   Initial Visit    Status: Additional Care Required  Permanent Disability:No    Plan: MedicationTransfer Care    Diagnostics: X-ray    Comments:  Patient is to continue following up with occupational health for continued care and follow-up with physical therapy as referred and specialist care as needed.    Disability Information   Status:   Comments:Prior disability is to be continued.    From:     Through:   Restrictions are:     Physical Restrictions   Sitting:    Standing:    Stooping:    Bending:      Squatting:    Walking:    Climbing:    Pushing:      Pulling:    Other:    Reaching Above Shoulder (L):   Reaching Above Shoulder (R):       Reaching Below Shoulder (L):    Reaching Below Shoulder (R):      Not to exceed Weight Limits   Carrying(hrs):   Weight Limit(lb):   Lifting(hrs):   Weight  Limit(lb):     Comments:      Repetitive Actions   Hands: i.e. Fine Manipulations from Grasping:     Feet: i.e. Operating Foot Controls:     Driving / Operate Machinery:     Physician Name: Jhon Moore Physician Signature:  fabrizio-MARINA Kuhn M.D. e-Signature:  , Medical Director   Clinic Name / Location: [facility], EMERGENCY DEPT  [address] [place] [address]  [phone]     Clinic Phone Number: Dept: [phone]   Appointment Time:  Visit Start Time:    Check-In Time:  10:06 AM Visit Discharge Time:    Original-Treating Physician or Chiropractor    Page 2-Insurer/TPA    Page 3-Employer    Page 4-Employee

## 2023-11-07 NOTE — ED NOTES
Pt discharged home.  pt in possession of belongings. Pt provided discharge education and information pertaining to medications and follow up appointments. Pt received copy of discharge instructions and verbalized understanding. BP (!) 151/100   Pulse 61   Temp 37.1 °C (98.7 °F) (Temporal)   Resp 16   Wt 66 kg (145 lb 8.1 oz)   SpO2 99%   BMI 21.49 kg/m²

## 2023-11-07 NOTE — ED PROVIDER NOTES
CHIEF COMPLAINT  Chief Complaint   Patient presents with    Neck Pain    Shoulder Pain       LIMITATION TO HISTORY   Select: none    HPI    Tariq Rivera is a 53 y.o. male who presents to the Emergency Department complaining of continued neck pain right shoulder pain right-sided back pain.  The pparently back in September the patient had an occupational injury in which she was pulling a heavy object and started having pain and spasms within his right side of his neck and in the right side of his back.  Patient has been seen by the occupational medicine clinic on 10-11, 10-19 and 10-30.  Today apparently was still having continued pain and he states that he presented to the emergency department because the pain was just out of control.  Patient is complaining primarily of pain within his right neck with radiation down his right arm as well as into the right back.  Denies any fevers chills not nausea vomiting or any other symptoms.    OUTSIDE HISTORIAN(S):  Select: None    EXTERNAL RECORDS REVIEWED  Select: Other reviewed the occupational notes of 1011 through 1030 diagnosed with cervical strain patient on light duty      PAST MEDICAL HISTORY  Past Medical History:   Diagnosis Date    Asthma     Hemorrhoids, external 11/11/2013     .    SURGICAL HISTORY  Past Surgical History:   Procedure Laterality Date    CO EXPLORATORY OF ABDOMEN  4/2/2023    Procedure: LAPAROTOMY, WITH LYSIS OF ADHESIONS;  Surgeon: John Abdalla M.D.;  Location: SURGERY [facility];  Service: General    ARCH BAR APPLICATION N/A 9/6/2015    Procedure: ARCH BAR APPLICATION;  Surgeon: Yashira Gordon M.D.;  Location: SURGERY [facility];  Service:     LACERATION REPAIR N/A 9/6/2015    Procedure: LACERATION REPAIR;  Surgeon: Yashira Gordon M.D.;  Location: SURGERY [facility];  Service:          FAMILY HISTORY  History reviewed. No pertinent family history.       SOCIAL HISTORY  Social History     Socioeconomic History    Marital status:  Single     Spouse name: Not on file    Number of children: Not on file    Years of education: Not on file    Highest education level: Not on file   Occupational History    Not on file   Tobacco Use    Smoking status: Every Day     Current packs/day: 0.50     Types: Cigarettes    Smokeless tobacco: Never   Vaping Use    Vaping Use: Never used   Substance and Sexual Activity    Alcohol use: Yes    Drug use: Not Currently     Types: Inhaled     Comment: marijuana    Sexual activity: Not on file   Other Topics Concern    Not on file   Social History Narrative    Not on file     Social Determinants of Health     Financial Resource Strain: Not on file   Food Insecurity: Not on file   Transportation Needs: Not on file   Physical Activity: Not on file   Stress: Not on file   Social Connections: Not on file   Intimate Partner Violence: Not on file   Housing Stability: Not on file         CURRENT MEDICATIONS  No current facility-administered medications on file prior to encounter.     No current outpatient medications on file prior to encounter.           ALLERGIES  Allergies   Allergen Reactions    Nkda [No Known Drug Allergy]        PHYSICAL EXAM  VITAL SIGNS:BP (!) 151/100   Pulse 61   Temp 37.1 °C (98.7 °F) (Temporal)   Resp 16   Wt 66 kg (145 lb 8.1 oz)   SpO2 99%   BMI 21.49 kg/m²     Constitutional: Well-developed no acute distress   HENT: Normocephalic, Atraumatic, Bilateral external ears normal.  Neck: Patient is tender about the right paraspinous musculature.  He does have good flexion extension of the cervical spine but limited range and rotation.  Is tender about the right paraspinous musculature into his upper back.  Thorax & Lungs: Chest wall is nontender.'s are clear to auscultation bilaterally  Skin: Warm, Dry, No erythema,   Back: No tenderness,  Musculoskeletal: Patient has difficulty raising his right shoulder he describes discomfort and pain within the shoulder.  Strength is otherwise 5/5 in  distributions.  Distal pulses are intact.  Neurologic: Alert & oriented x 3, distal sensation is intact  Psychiatric: Affect normal, Judgment normal, Mood normal.       DIAGNOSTIC STUDIES / PROCEDURES    LABS    None  RADIOLOGY    Radiology interpreted by myself as: No acute fracture on C-spine x-ray  DX-CERVICAL SPINE-2 OR 3 VIEWS   Final Result      1.  Moderate osteoarthritic changes at the C6-7 level with mild osteophytic change C5-6 level.           COURSE & MEDICAL DECISION MAKING    ED COURSE:    ED Observation Status?  No, the patient does not meet observation criteria.  INTERVENTIONS BY ME:  Medications   ketorolac (Toradol) injection 15 mg (15 mg Intramuscular Given 11/7/23 1127)           INITIAL ASSESSMENT, COURSE AND PLAN  Care Narrative: Patient presents for evaluation.  The patient's symptomatology is most likely muscular spasm of his paraspinous musculature of the cervical area as well as the right trapezium.  I will give the patient a sling.  X-ray does show osteoarthritic changes in his neck but no acute fractures or malalignments.  At this point I will give her a prescription of Norco for pain control I did give him a dose of Toradol here for pain control.  The patient is to continue his follow-up with occupational health as per their plan for physical therapy and referral as needed.  Patient should return as needed.  At this point I do not find any emergent process.      ADDITIONAL PROBLEM LIST  None  DISPOSITION AND DISCUSSIONS    I have discussed management of the patient with the following physicians and CAROLYN's: None        FINAL DIAGNOSIS  1. Strain of neck muscle, initial encounter           The patient will return for new or worsening symptoms and is stable at the time of discharge.    The patient is referred to a primary physician for blood pressure management, diabetic screening, and for all other preventative health concerns.        DISPOSITION:  Patient will be discharged home in stable  condition.    FOLLOW UP:  [facility]  [address] [address] [phone]    Continue your care here.      OUTPATIENT MEDICATIONS:  Discharge Medication List as of 11/7/2023 12:43 PM        START taking these medications    Details   methylPREDNISolone (MEDROL DOSEPAK) 4 MG Tablet Therapy Pack Please dispense a Medrol Dosepak with instructions, use as directed, Disp-21 Tablet, R-0, Normal      HYDROcodone-acetaminophen (NORCO) 5-325 MG Tab per tablet Take 1 Tablet by mouth every 6 hours as needed (pain) for up to 3 days., Disp-10 Tablet, R-0, Normal         I reviewed prescription monitoring program for patient's narcotic use before prescribing a scheduled drug.The patient will not drink alcohol nor drive with prescribed medications      In prescribing controlled substances to this patient, I certify that I have obtained and reviewed the medical history this patient I have also made a good omer effort to obtain applicable records from other providers who have treated the patient and records did not demonstrate any increased risk of substance abuse that would prevent me from prescribing controlled substances.     I have conducted a physical exam and documented it. I have reviewed Mr. Rivera’s prescription history as maintained by the [place] Prescription Monitoring Program.     I have assessed the patient’s risk for abuse, dependency, and addiction using the validated Opioid Risk Tool available at https://www.mdcalc.com/rqhzrk-tgoa-wnrz-ort-narcotic-abuse.     Given the above, I believe the benefits of controlled substance therapy outweigh the risks. The reasons for prescribing controlled substances include in my professional opinion, controlled substances are a reasonable choice for this patient. Accordingly, I have discussed the risk and benefits, treatment plan, and alternative therapies with the patient. The patient has been consented for the medication and understands the  risks.            Electronically signed by: Jhon Moore M.D.,2:51 PM 11/07/23

## 2023-11-07 NOTE — LETTER
[facility], EMERGENCY DEPT   [address], [place] [address]  Phone: Dept: [phone] - Fax:        [facility] Progress Report and Disability Certification  Date of Service: 11/7/2023   No Show:  No  Date / Time of Next Visit:     Claim Information   Patient Name: Tariq Rivera  Claim Number:     Employer: [employer] Date of Injury: 09-     Insurer / TPA: [facility] ID / SSN:    Occupation:  Diagnosis: The encounter diagnosis was Strain of neck muscle, initial encounter.    Medical Information   Related to Industrial Injury? Yes    Subjective Complaints:  Still having continued pain within the right side of the neck and right back.   Objective Findings: She does have a muscular spasm in the right paraspinous musculature into the right shoulder as well as into the right back.   Pre-Existing Condition(s):     Assessment:   Initial Visit    Status: Additional Care Required  Permanent Disability:No    Plan: MedicationTransfer Care    Diagnostics: X-ray    Comments:  Patient is to continue following up with occupational health for continued care and follow-up with physical therapy as referred and specialist care as needed.    Disability Information   Status:   Comments:Prior disability is to be continued.    From:     Through:   Restrictions are:     Physical Restrictions   Sitting:    Standing:    Stooping:    Bending:      Squatting:    Walking:    Climbing:    Pushing:      Pulling:    Other:    Reaching Above Shoulder (L):   Reaching Above Shoulder (R):       Reaching Below Shoulder (L):    Reaching Below Shoulder (R):      Not to exceed Weight Limits   Carrying(hrs):   Weight Limit(lb):   Lifting(hrs):   Weight  Limit(lb):     Comments:      Repetitive Actions   Hands: i.e. Fine Manipulations from Grasping:     Feet: i.e. Operating Foot Controls:     Driving / Operate Machinery:     Physician Name:   Jhon Moore  Physician Signature: fabrizio-MARINA Kuhn M.D. e-Signature:  , Medical Director   Clinic Name / Location: [facility], EMERGENCY DEPT  [address] [place] [address]  [phone]     Clinic Phone Number: Dept: [phone]   Appointment Time:  Visit Start Time:    Check-In Time:  10:06 AM Visit Discharge Time:    Original-Treating Physician or Chiropractor    Page 2-Insurer/TPA    Page 3-Employer    Page 4-Employee

## 2023-11-07 NOTE — ED TRIAGE NOTES
Pt ambulatory to triage c/o right sided neck shoulder pain due to a work related injury that occurred Sept 4th. Pt states he was seen at urgent care prescribed nsaids however no relief of pain. nad

## 2024-04-22 ENCOUNTER — OFFICE VISIT (OUTPATIENT)
Dept: URGENT CARE | Facility: CLINIC | Age: 54
End: 2024-04-22

## 2024-04-22 VITALS
WEIGHT: 141.1 LBS | HEART RATE: 65 BPM | OXYGEN SATURATION: 97 % | HEIGHT: 69 IN | DIASTOLIC BLOOD PRESSURE: 80 MMHG | SYSTOLIC BLOOD PRESSURE: 118 MMHG | TEMPERATURE: 97.6 F | RESPIRATION RATE: 16 BRPM | BODY MASS INDEX: 20.9 KG/M2

## 2024-04-22 DIAGNOSIS — R11.2 NAUSEA AND VOMITING, UNSPECIFIED VOMITING TYPE: ICD-10-CM

## 2024-04-22 LAB
FLUAV RNA SPEC QL NAA+PROBE: NEGATIVE
FLUBV RNA SPEC QL NAA+PROBE: NEGATIVE
RSV RNA SPEC QL NAA+PROBE: NEGATIVE
SARS-COV-2 RNA RESP QL NAA+PROBE: NEGATIVE

## 2024-04-22 PROCEDURE — 99213 OFFICE O/P EST LOW 20 MIN: CPT

## 2024-04-22 PROCEDURE — 3079F DIAST BP 80-89 MM HG: CPT

## 2024-04-22 PROCEDURE — 3074F SYST BP LT 130 MM HG: CPT

## 2024-04-22 PROCEDURE — 0241U POCT CEPHEID COV-2, FLU A/B, RSV - PCR: CPT

## 2024-04-22 RX ORDER — ONDANSETRON 4 MG/1
4 TABLET, ORALLY DISINTEGRATING ORAL EVERY 6 HOURS PRN
Qty: 15 TABLET | Refills: 0 | Status: SHIPPED | OUTPATIENT
Start: 2024-04-22

## 2024-04-22 RX ORDER — ONDANSETRON 4 MG/1
4 TABLET, ORALLY DISINTEGRATING ORAL ONCE
Status: COMPLETED | OUTPATIENT
Start: 2024-04-22 | End: 2024-04-22

## 2024-04-22 RX ADMIN — ONDANSETRON 4 MG: 4 TABLET, ORALLY DISINTEGRATING ORAL at 13:53

## 2024-04-22 ASSESSMENT — ENCOUNTER SYMPTOMS
NAUSEA: 1
FEVER: 0
HEADACHES: 0
SORE THROAT: 0
BLOOD IN STOOL: 0
CHILLS: 1
SHORTNESS OF BREATH: 0
VOMITING: 1
MYALGIAS: 0
WEAKNESS: 0
DIARRHEA: 1
FATIGUE: 1
ABDOMINAL PAIN: 0
COUGH: 0

## 2024-04-22 ASSESSMENT — FIBROSIS 4 INDEX: FIB4 SCORE: 1.577136263945225103

## 2024-04-22 NOTE — PROGRESS NOTES
"Subjective:   Tariq Rivera is a 53 y.o. male who presents for Vomiting (X 1 day, vomiting that caused lost voice)      Vomiting  This is a new problem. Episode onset: Last night. The problem has been gradually improving. Associated symptoms include chills, fatigue, nausea and vomiting. Pertinent negatives include no abdominal pain (Abdominal discomfort), chest pain, congestion, coughing, fever, headaches, myalgias, rash, sore throat or weakness. The symptoms are aggravated by eating. He has tried drinking for the symptoms. The treatment provided no relief.       Review of Systems   Constitutional:  Positive for chills and fatigue. Negative for fever and malaise/fatigue.   HENT:  Negative for congestion, ear pain, hearing loss and sore throat.    Respiratory:  Negative for cough and shortness of breath.    Cardiovascular:  Negative for chest pain.   Gastrointestinal:  Positive for diarrhea, nausea and vomiting. Negative for abdominal pain (Abdominal discomfort), blood in stool and melena.   Genitourinary:  Negative for dysuria.   Musculoskeletal:  Negative for myalgias.   Skin:  Negative for rash.   Neurological:  Negative for weakness and headaches.       Medications, Allergies, and current problem list reviewed today in Epic.     Objective:     /80 (BP Location: Left arm, Patient Position: Sitting, BP Cuff Size: Adult)   Pulse 65   Temp 36.4 °C (97.6 °F) (Temporal)   Resp 16   Ht 1.753 m (5' 9\")   Wt 64 kg (141 lb 1.6 oz)   SpO2 97%     Physical Exam  Vitals and nursing note reviewed.   Constitutional:       Appearance: Normal appearance.   HENT:      Head: Normocephalic and atraumatic.      Right Ear: Tympanic membrane normal.      Left Ear: Tympanic membrane normal.      Nose: Nose normal.      Mouth/Throat:      Mouth: Mucous membranes are moist.      Pharynx: Oropharynx is clear.   Eyes:      Conjunctiva/sclera: Conjunctivae normal.      Pupils: Pupils are equal, round, and reactive to light. "   Cardiovascular:      Rate and Rhythm: Normal rate.      Heart sounds: Normal heart sounds.   Pulmonary:      Effort: Pulmonary effort is normal.      Breath sounds: Normal breath sounds.   Abdominal:      General: Abdomen is flat. There is no distension.      Palpations: Abdomen is soft.      Tenderness: There is no abdominal tenderness. There is no guarding or rebound.   Skin:     General: Skin is warm and dry.      Capillary Refill: Capillary refill takes less than 2 seconds.   Neurological:      Mental Status: He is alert and oriented to person, place, and time.   Psychiatric:         Mood and Affect: Mood normal.         Behavior: Behavior normal.       Results for orders placed or performed in visit on 04/22/24   POCT CoV-2, Flu A/B, RSV by PCR   Result Value Ref Range    SARS-CoV-2 by PCR Negative Negative, Invalid    Influenza virus A RNA Negative Negative, Invalid    Influenza virus B, PCR Negative Negative, Invalid    RSV, PCR Negative Negative, Invalid         Assessment/Plan:       1. Nausea and vomiting, unspecified vomiting type  POCT CoV-2, Flu A/B, RSV by PCR    ondansetron (ZOFRAN ODT) 4 MG TABLET DISPERSIBLE    ondansetron (Zofran ODT) dispertab 4 mg        After ROS and physical exam patient reports that he had significant vomiting with bouts of diarrhea yesterday.  Patient reports that the symptoms have improved and has been able to drink water but still unable to eat food due to the nausea.  Patient has no other significant symptoms.  Patient was provided 4 mg of oral Zofran in office.  Viral swab was performed in office and was negative.  Patient was sent Zofran prescription.  Patient instructed to focus on oral rehydration with water/electrolyte replacement.  Patient instructed to advance diet as tolerated and to follow a BRAT diet.  Patient told to avoid any spicy or fatty foods and caffeine.  Patient instructed to monitor for any worsening signs and symptoms and if any other concerns return  to the urgent care or emergency department for further management.    Differential diagnosis, natural history, and supportive care discussed. We also reviewed side effects of medication including allergic response, GI upset, tendon injury, rash, sedation etc. Patient and/or guardian voices understanding.      Advised the patient to follow-up with the primary care physician for recheck, reevaluation, and consideration of further management.    I personally reviewed prior external notes and test results pertinent to today's visit as well as additional imaging and testing completed in clinic today.     Please note that this dictation was created using voice recognition software. I have made every reasonable attempt to correct obvious errors, but I expect that there are errors of grammar and possibly content that I did not discover before finalizing the note.    This note was electronically signed by GONZALEZ Orozco

## 2024-04-22 NOTE — LETTER
[facility] [place] [facility]  [place] [address]     April 22, 2024    Patient: Tariq Rivera   YOB: 1970   Date of Visit: 4/22/2024       To Whom It May Concern:    Tariq Rivera was seen and treated in our department on 4/22/2024. Please excuse Patient for recent illness that began Sunday (4/21/2024). Patient able to return to work on Wednesday (4/24/2024) if symptoms are improving and fever free for 24 hours.     Sincerely,     ZOIFA Sorensen.

## 2024-05-06 ENCOUNTER — HOSPITAL ENCOUNTER (EMERGENCY)
Facility: MEDICAL CENTER | Age: 54
End: 2024-05-06
Attending: STUDENT IN AN ORGANIZED HEALTH CARE EDUCATION/TRAINING PROGRAM

## 2024-05-06 VITALS
SYSTOLIC BLOOD PRESSURE: 114 MMHG | RESPIRATION RATE: 16 BRPM | DIASTOLIC BLOOD PRESSURE: 69 MMHG | WEIGHT: 148.15 LBS | OXYGEN SATURATION: 92 % | TEMPERATURE: 96.8 F | HEIGHT: 69 IN | BODY MASS INDEX: 21.94 KG/M2 | HEART RATE: 56 BPM

## 2024-05-06 DIAGNOSIS — R11.2 NAUSEA AND VOMITING, UNSPECIFIED VOMITING TYPE: ICD-10-CM

## 2024-05-06 DIAGNOSIS — R19.7 DIARRHEA, UNSPECIFIED TYPE: ICD-10-CM

## 2024-05-06 LAB
ALBUMIN SERPL BCP-MCNC: 3.8 G/DL (ref 3.2–4.9)
ALBUMIN/GLOB SERPL: 1.4 G/DL
ALP SERPL-CCNC: 93 U/L (ref 30–99)
ALT SERPL-CCNC: 8 U/L (ref 2–50)
ANION GAP SERPL CALC-SCNC: 12 MMOL/L (ref 7–16)
AST SERPL-CCNC: 25 U/L (ref 12–45)
BASOPHILS # BLD AUTO: 0.2 % (ref 0–1.8)
BASOPHILS # BLD: 0.01 K/UL (ref 0–0.12)
BILIRUB SERPL-MCNC: 1.3 MG/DL (ref 0.1–1.5)
BUN SERPL-MCNC: 13 MG/DL (ref 8–22)
CALCIUM ALBUM COR SERPL-MCNC: 8.8 MG/DL (ref 8.5–10.5)
CALCIUM SERPL-MCNC: 8.6 MG/DL (ref 8.5–10.5)
CHLORIDE SERPL-SCNC: 103 MMOL/L (ref 96–112)
CO2 SERPL-SCNC: 20 MMOL/L (ref 20–33)
CREAT SERPL-MCNC: 0.8 MG/DL (ref 0.5–1.4)
EOSINOPHIL # BLD AUTO: 0.18 K/UL (ref 0–0.51)
EOSINOPHIL NFR BLD: 3.5 % (ref 0–6.9)
ERYTHROCYTE [DISTWIDTH] IN BLOOD BY AUTOMATED COUNT: 42.7 FL (ref 35.9–50)
FLUAV RNA SPEC QL NAA+PROBE: NEGATIVE
FLUBV RNA SPEC QL NAA+PROBE: NEGATIVE
GFR SERPLBLD CREATININE-BSD FMLA CKD-EPI: 107 ML/MIN/1.73 M 2
GLOBULIN SER CALC-MCNC: 2.8 G/DL (ref 1.9–3.5)
GLUCOSE SERPL-MCNC: 97 MG/DL (ref 65–99)
HCT VFR BLD AUTO: 46 % (ref 42–52)
HGB BLD-MCNC: 16 G/DL (ref 14–18)
IMM GRANULOCYTES # BLD AUTO: 0.03 K/UL (ref 0–0.11)
IMM GRANULOCYTES NFR BLD AUTO: 0.6 % (ref 0–0.9)
LIPASE SERPL-CCNC: 36 U/L (ref 11–82)
LYMPHOCYTES # BLD AUTO: 2.23 K/UL (ref 1–4.8)
LYMPHOCYTES NFR BLD: 43.6 % (ref 22–41)
MCH RBC QN AUTO: 30.2 PG (ref 27–33)
MCHC RBC AUTO-ENTMCNC: 34.8 G/DL (ref 32.3–36.5)
MCV RBC AUTO: 86.8 FL (ref 81.4–97.8)
MONOCYTES # BLD AUTO: 0.59 K/UL (ref 0–0.85)
MONOCYTES NFR BLD AUTO: 11.5 % (ref 0–13.4)
NEUTROPHILS # BLD AUTO: 2.07 K/UL (ref 1.82–7.42)
NEUTROPHILS NFR BLD: 40.6 % (ref 44–72)
NRBC # BLD AUTO: 0 K/UL
NRBC BLD-RTO: 0 /100 WBC (ref 0–0.2)
PLATELET # BLD AUTO: 170 K/UL (ref 164–446)
PMV BLD AUTO: 10.9 FL (ref 9–12.9)
POTASSIUM SERPL-SCNC: 3.6 MMOL/L (ref 3.6–5.5)
PROT SERPL-MCNC: 6.6 G/DL (ref 6–8.2)
RBC # BLD AUTO: 5.3 M/UL (ref 4.7–6.1)
RSV RNA SPEC QL NAA+PROBE: NEGATIVE
SARS-COV-2 RNA RESP QL NAA+PROBE: NOTDETECTED
SODIUM SERPL-SCNC: 135 MMOL/L (ref 135–145)
WBC # BLD AUTO: 5.1 K/UL (ref 4.8–10.8)

## 2024-05-06 RX ORDER — ONDANSETRON 2 MG/ML
4 INJECTION INTRAMUSCULAR; INTRAVENOUS ONCE
Status: DISCONTINUED | OUTPATIENT
Start: 2024-05-06 | End: 2024-05-06

## 2024-05-06 RX ORDER — SODIUM CHLORIDE, SODIUM LACTATE, POTASSIUM CHLORIDE, CALCIUM CHLORIDE 600; 310; 30; 20 MG/100ML; MG/100ML; MG/100ML; MG/100ML
1000 INJECTION, SOLUTION INTRAVENOUS ONCE
Status: DISCONTINUED | OUTPATIENT
Start: 2024-05-06 | End: 2024-05-06

## 2024-05-06 RX ORDER — KETOROLAC TROMETHAMINE 15 MG/ML
15 INJECTION, SOLUTION INTRAMUSCULAR; INTRAVENOUS ONCE
Status: DISCONTINUED | OUTPATIENT
Start: 2024-05-06 | End: 2024-05-06

## 2024-05-06 RX ORDER — ONDANSETRON 4 MG/1
4 TABLET, ORALLY DISINTEGRATING ORAL EVERY 6 HOURS PRN
Qty: 10 TABLET | Refills: 0 | Status: SHIPPED | OUTPATIENT
Start: 2024-05-06

## 2024-05-06 RX ORDER — ACETAMINOPHEN 500 MG
1000 TABLET ORAL ONCE
Status: COMPLETED | OUTPATIENT
Start: 2024-05-06 | End: 2024-05-06

## 2024-05-06 RX ORDER — ONDANSETRON 4 MG/1
4 TABLET, ORALLY DISINTEGRATING ORAL ONCE
Status: COMPLETED | OUTPATIENT
Start: 2024-05-06 | End: 2024-05-06

## 2024-05-06 RX ADMIN — ONDANSETRON 4 MG: 4 TABLET, ORALLY DISINTEGRATING ORAL at 02:03

## 2024-05-06 RX ADMIN — ACETAMINOPHEN 1000 MG: 500 TABLET, FILM COATED ORAL at 01:47

## 2024-05-06 ASSESSMENT — FIBROSIS 4 INDEX: FIB4 SCORE: 1.577136263945225103

## 2024-05-06 NOTE — ED NOTES
Pt left two keys in room upon discharge. Pt called and notified. Pt states he will pick them up in the morning. Told pt they will be at front lobby desk.

## 2024-05-06 NOTE — DISCHARGE INSTRUCTIONS
You were seen for hot and cold chills, vomiting, diarrhea. Your lab tests are reassuring. Please rest, take tylenol and motrin. I have prescribed you zofran. Return for worsening symptoms, chest pain, difficulty breathing, or any other concerns.

## 2024-05-06 NOTE — ED NOTES
Pt aox4, skin pink warm and dry, airway patent, rr even and unlabored, NAD noted. No new complaints. Pt vss. Pt given discharge instructions without further questions. Ambulates upon discharge with steady gait without new incident or distress.

## 2024-05-06 NOTE — ED TRIAGE NOTES
"Chief Complaint   Patient presents with    N/V    Chills     Patient ambulates to triage c/o n/v/chills that started yesterday, denies exposure to anyone who has been sick lately.        BP (!) 145/75   Pulse 83   Temp 36 °C (96.8 °F) (Temporal)   Resp 20   Ht 1.753 m (5' 9\")   Wt 67.2 kg (148 lb 2.4 oz)   SpO2 99%     Patient educated on ed triage process, instructed to notify staff of any new or worsening symptoms, verbalizes understanding. Patient returned to ed lobby, apologized for wait times.     "

## 2024-05-06 NOTE — ED NOTES
Pt resting, airway patent, rr even and unlabored, no new complaints or distress noted at this time. Rails up times two, call light within reach.

## 2024-05-06 NOTE — ED PROVIDER NOTES
ED Provider Note    CHIEF COMPLAINT  Chief Complaint   Patient presents with    N/V    Chills     Patient ambulates to triage c/o n/v/chills that started yesterday, denies exposure to anyone who has been sick lately.        EXTERNAL RECORDS REVIEWED  Outpatient Notes Patient seen at urgent care 04/22/2024 for vomiting and diarrhea and was treated with Zofran    HPI/ROS  LIMITATION TO HISTORY   Select: : None  OUTSIDE HISTORIAN(S):  None    Tariq Rivera is a 53 y.o. male who presents for evaluation of nausea, vomiting, diarrhea which started Saturday night. He was at a friend's house eating and drinking alcohol. He does not think anyone else is sick. Did not eat any undercooked meat. He denies blood in his stool or vomit. Has not vomited or had diarrhea since last night but is having hot and cold sweats. He denies any runny nose, sore throat, abdominal pain, chest pain, difficulty breathing, cough, dysuria. He has no chronic medical problems. Has taken no medications for his symptoms.     PAST MEDICAL HISTORY   has a past medical history of Asthma and Hemorrhoids, external (11/11/2013).    SURGICAL HISTORY   has a past surgical history that includes arch bar application (N/A, 9/6/2015); laceration repair (N/A, 9/6/2015); and exploratory of abdomen (4/2/2023).    FAMILY HISTORY  No family history on file.    SOCIAL HISTORY  Social History     Tobacco Use    Smoking status: Every Day     Current packs/day: 0.50     Types: Cigarettes    Smokeless tobacco: Never   Vaping Use    Vaping Use: Never used   Substance and Sexual Activity    Alcohol use: Not Currently    Drug use: Not Currently     Types: Inhaled     Comment: marijuana    Sexual activity: Not on file       CURRENT MEDICATIONS  Home Medications       Reviewed by Zoey Torres R.N. (Registered Nurse) on 05/06/24 at 0025  Med List Status: Not Addressed     Medication Last Dose Status   ondansetron (ZOFRAN ODT) 4 MG TABLET DISPERSIBLE  Active          "           ALLERGIES  Allergies   Allergen Reactions    Nkda [No Known Drug Allergy]        PHYSICAL EXAM  VITAL SIGNS: BP (!) 145/75   Pulse 83   Temp 36 °C (96.8 °F) (Temporal)   Resp 20   Ht 1.753 m (5' 9\")   Wt 67.2 kg (148 lb 2.4 oz)   SpO2 99%   BMI 21.88 kg/m²      Constitutional: Well developed, Well nourished, No acute respiratory distress, Non-toxic appearance.   HENT: Normocephalic, Atraumatic, Bilateral external ears normal, Oropharynx clear, mucous membranes are moist.  Eyes: Conjunctiva normal, No discharge. No icterus.  Neck: Normal range of motion. Supple.  Cardiovascular: Normal heart rate, Normal rhythm, No murmurs, No rubs, No gallops.   Thorax & Lungs: Clear to auscultation bilaterally, No respiratory distress, No wheezing.  Abdomen: Soft nontender normal bowel sounds no rebound masses or peritoneal signs  Skin: Warm, Dry, No erythema, No rash.   Extremities: Intact distal pulses, No edema, No tenderness  Musculoskeletal: Good range of motion in all major joints. Normal gait.  Neurologic: Alert & oriented. No focal deficits noted.   Psych: Alert normal affect       EKG/LABS  Results for orders placed or performed during the hospital encounter of 05/06/24   CBC WITH DIFFERENTIAL   Result Value Ref Range    WBC 5.1 4.8 - 10.8 K/uL    RBC 5.30 4.70 - 6.10 M/uL    Hemoglobin 16.0 14.0 - 18.0 g/dL    Hematocrit 46.0 42.0 - 52.0 %    MCV 86.8 81.4 - 97.8 fL    MCH 30.2 27.0 - 33.0 pg    MCHC 34.8 32.3 - 36.5 g/dL    RDW 42.7 35.9 - 50.0 fL    Platelet Count 170 164 - 446 K/uL    MPV 10.9 9.0 - 12.9 fL    Neutrophils-Polys 40.60 (L) 44.00 - 72.00 %    Lymphocytes 43.60 (H) 22.00 - 41.00 %    Monocytes 11.50 0.00 - 13.40 %    Eosinophils 3.50 0.00 - 6.90 %    Basophils 0.20 0.00 - 1.80 %    Immature Granulocytes 0.60 0.00 - 0.90 %    Nucleated RBC 0.00 0.00 - 0.20 /100 WBC    Neutrophils (Absolute) 2.07 1.82 - 7.42 K/uL    Lymphs (Absolute) 2.23 1.00 - 4.80 K/uL    Monos (Absolute) 0.59 0.00 - " 0.85 K/uL    Eos (Absolute) 0.18 0.00 - 0.51 K/uL    Baso (Absolute) 0.01 0.00 - 0.12 K/uL    Immature Granulocytes (abs) 0.03 0.00 - 0.11 K/uL    NRBC (Absolute) 0.00 K/uL   COMP METABOLIC PANEL   Result Value Ref Range    Sodium 135 135 - 145 mmol/L    Potassium 3.6 3.6 - 5.5 mmol/L    Chloride 103 96 - 112 mmol/L    Co2 20 20 - 33 mmol/L    Anion Gap 12.0 7.0 - 16.0    Glucose 97 65 - 99 mg/dL    Bun 13 8 - 22 mg/dL    Creatinine 0.80 0.50 - 1.40 mg/dL    Calcium 8.6 8.5 - 10.5 mg/dL    Correct Calcium 8.8 8.5 - 10.5 mg/dL    AST(SGOT) 25 12 - 45 U/L    ALT(SGPT) 8 2 - 50 U/L    Alkaline Phosphatase 93 30 - 99 U/L    Total Bilirubin 1.3 0.1 - 1.5 mg/dL    Albumin 3.8 3.2 - 4.9 g/dL    Total Protein 6.6 6.0 - 8.2 g/dL    Globulin 2.8 1.9 - 3.5 g/dL    A-G Ratio 1.4 g/dL   LIPASE   Result Value Ref Range    Lipase 36 11 - 82 U/L   ESTIMATED GFR   Result Value Ref Range    GFR (CKD-EPI) 107 >60 mL/min/1.73 m 2   POC CoV-2, FLU A/B, RSV by PCR   Result Value Ref Range    POC Influenza A RNA, PCR Negative Negative    POC Influenza B RNA, PCR Negative Negative    POC RSV, by PCR Negative Negative    POC SARS-CoV-2, PCR NotDetected        I have independently interpreted this EKG      COURSE & MEDICAL DECISION MAKING    ASSESSMENT, COURSE AND PLAN  Care Narrative:   This is a 52 yo male here with nausea, vomiting, diarrhea chills since last night. ON arrival vital signs are stable. He is nontoxic in appearance. Does not have a surgical abdomen or any tenderness whatsoever - no indication for abdominal imaging at this time.     Labs obtained to look for leukocytosis, electrolyte abnormality, dehydration. Viral swab obtained as well and is negative. No dysuria to suggest UTI. No measured fevers here. Did order IV fluids however patient declined. No headache to suggest intracranial source. No chest pain to suggest ACS. Suspect likely viral gastroenteritis. No risk factors for c diff, bacterial dysentery, parasitic  infection.     1:55 AM Patient declines IV for IV fluids.     3:14 AM Patient reevaluated, discussed results. Abd remains soft and nontender. Discussed supportive management. He will be rxed zofran. Instructed to return for abdominal pain, difficulty breathing or any other concerns. He is agreeable to dc plan with no further questions.             ADDITIONAL PROBLEMS MANAGED  None    DISPOSITION AND DISCUSSIONS  I have discussed management of the patient with the following physicians and CAROLYN's:  None    Discussion of management with other QHP or appropriate source(s): None     Escalation of care considered, and ultimately not performed:diagnostic imaging    Barriers to care at this time, including but not limited to:  None .     Decision tools and prescription drugs considered including, but not limited to:  None .     The patient will return for new or worsening symptoms and is stable at the time of discharge.    The patient is referred to a primary physician for blood pressure management, diabetic screening, and for all other preventative health concerns.      DISPOSITION:  Patient will be discharged home in stable condition.    FOLLOW UP:  Your primary care doctor          [facility], Emergency Dept  [facility] [facility] [address] [phone]          OUTPATIENT MEDICATIONS:  Discharge Medication List as of 5/6/2024  3:33 AM        START taking these medications    Details   !! ondansetron (ZOFRAN ODT) 4 MG TABLET DISPERSIBLE Take 1 Tablet by mouth every 6 hours as needed for Nausea/Vomiting., Disp-10 Tablet, R-0, Normal       !! - Potential duplicate medications found. Please discuss with provider.            FINAL DIAGNOSIS  1. Nausea and vomiting, unspecified vomiting type    2. Diarrhea, unspecified type           Electronically signed by: Isamar Casarez M.D., 5/6/2024 12:49 AM

## 2024-05-06 NOTE — Clinical Note
Tariq Rivera was seen and treated in our emergency department on 5/6/2024.  He may return to work on 05/09/2024.  Patient has been sick since Saturday     If you have any questions or concerns, please don't hesitate to call.      Isamar Casarez M.D.

## 2024-06-02 ENCOUNTER — APPOINTMENT (OUTPATIENT)
Dept: RADIOLOGY | Facility: MEDICAL CENTER | Age: 54
End: 2024-06-02
Attending: EMERGENCY MEDICINE

## 2024-06-02 ENCOUNTER — APPOINTMENT (OUTPATIENT)
Dept: URGENT CARE | Facility: CLINIC | Age: 54
End: 2024-06-02

## 2024-06-02 ENCOUNTER — HOSPITAL ENCOUNTER (EMERGENCY)
Facility: MEDICAL CENTER | Age: 54
End: 2024-06-02
Attending: EMERGENCY MEDICINE

## 2024-06-02 VITALS
HEART RATE: 75 BPM | RESPIRATION RATE: 16 BRPM | TEMPERATURE: 98.3 F | DIASTOLIC BLOOD PRESSURE: 80 MMHG | BODY MASS INDEX: 21.16 KG/M2 | SYSTOLIC BLOOD PRESSURE: 135 MMHG | OXYGEN SATURATION: 94 % | HEIGHT: 69 IN | WEIGHT: 142.86 LBS

## 2024-06-02 DIAGNOSIS — M25.551 PAIN OF RIGHT HIP: ICD-10-CM

## 2024-06-02 LAB
ALBUMIN SERPL BCP-MCNC: 4.5 G/DL (ref 3.2–4.9)
ALBUMIN/GLOB SERPL: 1.5 G/DL
ALP SERPL-CCNC: 105 U/L (ref 30–99)
ALT SERPL-CCNC: 11 U/L (ref 2–50)
ANION GAP SERPL CALC-SCNC: 13 MMOL/L (ref 7–16)
AST SERPL-CCNC: 18 U/L (ref 12–45)
BASOPHILS # BLD AUTO: 0.8 % (ref 0–1.8)
BASOPHILS # BLD: 0.05 K/UL (ref 0–0.12)
BILIRUB SERPL-MCNC: 1 MG/DL (ref 0.1–1.5)
BUN SERPL-MCNC: 10 MG/DL (ref 8–22)
CALCIUM ALBUM COR SERPL-MCNC: 8.9 MG/DL (ref 8.5–10.5)
CALCIUM SERPL-MCNC: 9.3 MG/DL (ref 8.5–10.5)
CHLORIDE SERPL-SCNC: 105 MMOL/L (ref 96–112)
CO2 SERPL-SCNC: 23 MMOL/L (ref 20–33)
CREAT SERPL-MCNC: 0.8 MG/DL (ref 0.5–1.4)
CRP SERPL HS-MCNC: <0.3 MG/DL (ref 0–0.75)
EOSINOPHIL # BLD AUTO: 0.31 K/UL (ref 0–0.51)
EOSINOPHIL NFR BLD: 4.8 % (ref 0–6.9)
ERYTHROCYTE [DISTWIDTH] IN BLOOD BY AUTOMATED COUNT: 44 FL (ref 35.9–50)
ERYTHROCYTE [SEDIMENTATION RATE] IN BLOOD BY WESTERGREN METHOD: 2 MM/HOUR (ref 0–20)
GFR SERPLBLD CREATININE-BSD FMLA CKD-EPI: 107 ML/MIN/1.73 M 2
GLOBULIN SER CALC-MCNC: 3 G/DL (ref 1.9–3.5)
GLUCOSE SERPL-MCNC: 67 MG/DL (ref 65–99)
HCT VFR BLD AUTO: 48.2 % (ref 42–52)
HGB BLD-MCNC: 16.3 G/DL (ref 14–18)
IMM GRANULOCYTES # BLD AUTO: 0.02 K/UL (ref 0–0.11)
IMM GRANULOCYTES NFR BLD AUTO: 0.3 % (ref 0–0.9)
LYMPHOCYTES # BLD AUTO: 2.59 K/UL (ref 1–4.8)
LYMPHOCYTES NFR BLD: 39.7 % (ref 22–41)
MCH RBC QN AUTO: 29.4 PG (ref 27–33)
MCHC RBC AUTO-ENTMCNC: 33.8 G/DL (ref 32.3–36.5)
MCV RBC AUTO: 86.8 FL (ref 81.4–97.8)
MONOCYTES # BLD AUTO: 0.41 K/UL (ref 0–0.85)
MONOCYTES NFR BLD AUTO: 6.3 % (ref 0–13.4)
NEUTROPHILS # BLD AUTO: 3.14 K/UL (ref 1.82–7.42)
NEUTROPHILS NFR BLD: 48.1 % (ref 44–72)
NRBC # BLD AUTO: 0 K/UL
NRBC BLD-RTO: 0 /100 WBC (ref 0–0.2)
PLATELET # BLD AUTO: 224 K/UL (ref 164–446)
PMV BLD AUTO: 10.4 FL (ref 9–12.9)
POTASSIUM SERPL-SCNC: 4.1 MMOL/L (ref 3.6–5.5)
PROT SERPL-MCNC: 7.5 G/DL (ref 6–8.2)
RBC # BLD AUTO: 5.55 M/UL (ref 4.7–6.1)
SODIUM SERPL-SCNC: 141 MMOL/L (ref 135–145)
WBC # BLD AUTO: 6.5 K/UL (ref 4.8–10.8)

## 2024-06-02 PROCEDURE — 86140 C-REACTIVE PROTEIN: CPT

## 2024-06-02 PROCEDURE — 36415 COLL VENOUS BLD VENIPUNCTURE: CPT

## 2024-06-02 PROCEDURE — 72192 CT PELVIS W/O DYE: CPT

## 2024-06-02 PROCEDURE — 96374 THER/PROPH/DIAG INJ IV PUSH: CPT

## 2024-06-02 PROCEDURE — 85025 COMPLETE CBC W/AUTO DIFF WBC: CPT

## 2024-06-02 PROCEDURE — 80053 COMPREHEN METABOLIC PANEL: CPT

## 2024-06-02 PROCEDURE — 73501 X-RAY EXAM HIP UNI 1 VIEW: CPT | Mod: RT

## 2024-06-02 PROCEDURE — 700111 HCHG RX REV CODE 636 W/ 250 OVERRIDE (IP): Mod: JZ | Performed by: EMERGENCY MEDICINE

## 2024-06-02 PROCEDURE — 85652 RBC SED RATE AUTOMATED: CPT

## 2024-06-02 PROCEDURE — 99284 EMERGENCY DEPT VISIT MOD MDM: CPT

## 2024-06-02 RX ORDER — HYDROCODONE BITARTRATE AND ACETAMINOPHEN 5; 325 MG/1; MG/1
1 TABLET ORAL EVERY 6 HOURS PRN
Qty: 8 TABLET | Refills: 0 | Status: SHIPPED | OUTPATIENT
Start: 2024-06-02 | End: 2024-06-04

## 2024-06-02 RX ORDER — METHOCARBAMOL 750 MG/1
750 TABLET, FILM COATED ORAL 4 TIMES DAILY
Qty: 12 TABLET | Refills: 0 | Status: SHIPPED | OUTPATIENT
Start: 2024-06-02 | End: 2024-06-05

## 2024-06-02 RX ORDER — IBUPROFEN 600 MG/1
600 TABLET ORAL EVERY 8 HOURS PRN
Qty: 9 TABLET | Refills: 0 | Status: SHIPPED | OUTPATIENT
Start: 2024-06-02 | End: 2024-06-05

## 2024-06-02 RX ORDER — LIDOCAINE 50 MG/G
1 PATCH TOPICAL EVERY 24 HOURS
Qty: 3 PATCH | Refills: 0 | Status: SHIPPED | OUTPATIENT
Start: 2024-06-02 | End: 2024-06-05

## 2024-06-02 RX ORDER — KETOROLAC TROMETHAMINE 15 MG/ML
15 INJECTION, SOLUTION INTRAMUSCULAR; INTRAVENOUS ONCE
Status: COMPLETED | OUTPATIENT
Start: 2024-06-02 | End: 2024-06-02

## 2024-06-02 RX ADMIN — KETOROLAC TROMETHAMINE 15 MG: 15 INJECTION, SOLUTION INTRAMUSCULAR; INTRAVENOUS at 14:15

## 2024-06-02 ASSESSMENT — FIBROSIS 4 INDEX: FIB4 SCORE: 2.651650429449553216

## 2024-06-02 NOTE — DISCHARGE INSTRUCTIONS
Return to ER for worsening hip pain, changing hip pain, fever over 100.4, chills, abdominal pain ,back pain, numbness/tingling/weakness of extremities, discoloration to the skin around the hip, loss of bowel or bladder control, or for any concerns/worsening.      Follow up with Dr. Pacheco, orthopedics, in 1-2 days. Please call for appt.    Use ice to help with the pain.  Avoid heat as that may make the pain worse.    Use crutches to avoid weight bearing.
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
## 2024-06-02 NOTE — ED TRIAGE NOTES
"Chief Complaint   Patient presents with    Leg Pain     Pt report his R leg \"locked out\" yesterday and now complaining of R hip pain, 8/10. Pt denies any trauma/injury to leg.        Pt ambulatory to triage. Pt A&Ox4, for above complaint.     Pt to lobby. Pt educated on alerting staff in changes to condition. Pt verbalized understanding.    BP (!) 133/92   Pulse 73   Temp 36.8 °C (98.3 °F) (Temporal)   Resp 14   Ht 1.753 m (5' 9\")   Wt 64.8 kg (142 lb 13.7 oz)   SpO2 95%   BMI 21.10 kg/m²     "

## 2024-06-02 NOTE — ED TRIAGE NOTES
".  Chief Complaint   Patient presents with    Leg Pain     Pt report his R leg \"locked out\" yesterday and now complaining of R hip pain, 8/10. Pt denies any trauma/injury to leg.    Agree with triage assessment.  Pt reports difficult to ambulate since yesterday.  RLE CMS intact.     "

## 2024-06-02 NOTE — ED NOTES
The patient has been provided with discharge education and information.  The patient was also provided with instructions on follow up care and return precautions.  The patient verbalizes understanding of discharge instructions, follow up care, and return precautions.  All questions have been answered.  Robaxin, norco, lidoderm RX prescribed by [facility].  NAD, A/Sommer, good color and appropriate at time of discharge.  Patient ambulated/crutched out of department.

## 2024-09-09 ENCOUNTER — HOSPITAL ENCOUNTER (EMERGENCY)
Facility: MEDICAL CENTER | Age: 54
End: 2024-09-09
Attending: EMERGENCY MEDICINE

## 2024-09-09 ENCOUNTER — PHARMACY VISIT (OUTPATIENT)
Dept: PHARMACY | Facility: MEDICAL CENTER | Age: 54
End: 2024-09-09
Payer: COMMERCIAL

## 2024-09-09 VITALS
BODY MASS INDEX: 21.36 KG/M2 | HEIGHT: 69 IN | DIASTOLIC BLOOD PRESSURE: 80 MMHG | OXYGEN SATURATION: 97 % | WEIGHT: 144.18 LBS | HEART RATE: 74 BPM | TEMPERATURE: 98 F | RESPIRATION RATE: 18 BRPM | SYSTOLIC BLOOD PRESSURE: 134 MMHG

## 2024-09-09 DIAGNOSIS — M25.551 PAIN OF RIGHT HIP: ICD-10-CM

## 2024-09-09 PROCEDURE — 700102 HCHG RX REV CODE 250 W/ 637 OVERRIDE(OP): Performed by: EMERGENCY MEDICINE

## 2024-09-09 PROCEDURE — 99283 EMERGENCY DEPT VISIT LOW MDM: CPT

## 2024-09-09 PROCEDURE — A9270 NON-COVERED ITEM OR SERVICE: HCPCS | Performed by: EMERGENCY MEDICINE

## 2024-09-09 PROCEDURE — RXMED WILLOW AMBULATORY MEDICATION CHARGE: Performed by: EMERGENCY MEDICINE

## 2024-09-09 RX ORDER — CYCLOBENZAPRINE HCL 10 MG
10 TABLET ORAL 3 TIMES DAILY PRN
Qty: 20 TABLET | Refills: 0 | Status: SHIPPED | OUTPATIENT
Start: 2024-09-09 | End: 2024-09-16

## 2024-09-09 RX ORDER — ACETAMINOPHEN 325 MG/1
975 TABLET ORAL ONCE
Status: COMPLETED | OUTPATIENT
Start: 2024-09-09 | End: 2024-09-09

## 2024-09-09 RX ORDER — OXYCODONE AND ACETAMINOPHEN 5; 325 MG/1; MG/1
1 TABLET ORAL EVERY 4 HOURS PRN
Qty: 20 TABLET | Refills: 0 | Status: SHIPPED | OUTPATIENT
Start: 2024-09-09 | End: 2024-09-14

## 2024-09-09 RX ADMIN — ACETAMINOPHEN 975 MG: 325 TABLET ORAL at 10:51

## 2024-09-09 RX ADMIN — IBUPROFEN 800 MG: 200 TABLET, FILM COATED ORAL at 10:51

## 2024-09-09 ASSESSMENT — FIBROSIS 4 INDEX: FIB4 SCORE: 1.259886689842797995

## 2024-09-09 NOTE — ED PROVIDER NOTES
ED Provider Note    CHIEF COMPLAINT  Chief Complaint   Patient presents with    Hip Pain     Pt reports R hip/low back that radiates down leg intermittently since last week. Denies trauma.        EXTERNAL RECORDS REVIEWED  Other patient was seen in the ER on June 2, 2024 for right hip pain for 3 years.  The patient had a CT scan that day on June 2, 2024 that showed degenerative changes of his hips.  PDMP the patient only has 1 narcotic prescription listed, oxycodone on April 6, 2023    HPI/ROS  LIMITATION TO HISTORY   Select: : None  OUTSIDE HISTORIAN(S):  None    Tariq Rivera is a 54 y.o. male who presents stating that he has had many years of right hip pain.  He was seen in our emergency department had a CT scan in April that showed degenerative changes.  He denies any new trauma.  He has been taking ibuprofen which is not helping.  He did not take anything for pain today.  He has had no incontinence of urine, no numbness or weakness of the lower extremities.    PAST MEDICAL HISTORY   has a past medical history of Asthma and Hemorrhoids, external (11/11/2013).    SURGICAL HISTORY   has a past surgical history that includes arch bar application (N/A, 9/6/2015); laceration repair (N/A, 9/6/2015); and exploratory of abdomen (4/2/2023).    FAMILY HISTORY  History reviewed. No pertinent family history.    SOCIAL HISTORY  Social History     Tobacco Use    Smoking status: Every Day     Current packs/day: 0.50     Types: Cigarettes    Smokeless tobacco: Never   Vaping Use    Vaping status: Never Used   Substance and Sexual Activity    Alcohol use: Not Currently    Drug use: Not Currently     Types: Inhaled     Comment: marijuana    Sexual activity: Not on file       CURRENT MEDICATIONS  Home Medications       Reviewed by Brianna Cardona R.N. (Registered Nurse) on 09/09/24 at 0956  Med List Status: Not Addressed     Medication Last Dose Status   ondansetron (ZOFRAN ODT) 4 MG TABLET DISPERSIBLE  Active  "  ondansetron (ZOFRAN ODT) 4 MG TABLET DISPERSIBLE  Active                    ALLERGIES  Allergies   Allergen Reactions    Nkda [No Known Drug Allergy]        PHYSICAL EXAM  VITAL SIGNS: /87   Pulse 75   Temp 36.5 °C (97.7 °F) (Temporal)   Resp 16   Ht 1.753 m (5' 9\")   Wt 65.4 kg (144 lb 2.9 oz)   SpO2 97%   BMI 21.29 kg/m²      Right lower extremity.  The patient has tenderness over the right hip with pain with range of motion.  He has no motor or sensory deficits.  He has no evidence of spinal cord compression.          COURSE & MEDICAL DECISION MAKING    ASSESSMENT, COURSE AND PLAN  Care Narrative:     The patient presents with acute on chronic right hip pain.  He has had a previous CT scan.  At this time I do not believe there is any indication for reimaging at this time.  Here he was given Tylenol 975 mg p.o. and ibuprofen 800 mg p.o.  I will refer him to the pain specialist to his walk-in clinic 9-5 Monday through Friday.  The patient may require injections into his joints or physical therapy.              ADDITIONAL PROBLEMS MANAGED  Right hip pain    DISPOSITION AND DISCUSSIONS  I have discussed management of the patient with the following physicians and CAROLYN's: None    Discussion of management with other [facility] or appropriate source(s): None     Escalation of care considered, and ultimately not performed:diagnostic imaging    Barriers to care at this time, including but not limited to:  None .     Decision tools and prescription drugs considered including, but not limited to:  Prescribed the patient Percocet for pain, he has only 1 controlled substance on his PDMP .    I reviewed prescription monitoring program for patient's narcotic use before prescribing a scheduled drug.The patient will not drink alcohol nor drive with prescribed medications. The patient will return for new or worsening symptoms and is stable at the time of discharge.    The patient is referred to a primary physician for blood " pressure management, diabetic screening, and for all other preventative health concerns.    In prescribing controlled substances to this patient, I certify that I have obtained and reviewed the medical history of Tariq Rivera. I have also made a good omer effort to obtain applicable records from other providers who have treated the patient and records did not demonstrate any increased risk of substance abuse that would prevent me from prescribing controlled substances.     I have conducted a physical exam and documented it. I have reviewed Mr. Rivera’s prescription history as maintained by the [place] Prescription Monitoring Program.     I have assessed the patient’s risk for abuse, dependency, and addiction using the validated Opioid Risk Tool available at https://www.mdcalc.com/ldihpq-xayh-ihnl-ort-narcotic-abuse.     Given the above, I believe the benefits of controlled substance therapy outweigh the risks. The reasons for prescribing controlled substances include non-narcotic, oral analgesic alternatives have been inadequate for pain control. Accordingly, I have discussed the risk and benefits, treatment plan, and alternative therapies with the patient.       DISPOSITION:  Patient will be discharged home in stable condition.    FOLLOW UP:  [facility], Emergency Dept  [address] [address]  [phone]    If symptoms worsen    Terrance Younger M.D.  [address] [facility] [place]  [facility] [id number] [phone]      Go to this pain specialist, he has a walk-in clinic Monday through Friday 9-5 no appointment necessary      OUTPATIENT MEDICATIONS:  New Prescriptions    OXYCODONE-ACETAMINOPHEN (PERCOCET) 5-325 MG TAB    Take 1 Tablet by mouth every four hours as needed (pain) for up to 5 days. No driving, no drinking alcohol         FINAL DIAGNOSIS  1. Pain of right hip         Electronically signed by: Fernandez Roa M.D., 9/9/2024 10:25 AM

## 2024-09-09 NOTE — ED TRIAGE NOTES
"Chief Complaint   Patient presents with    Hip Pain     Pt reports R hip/low back that radiates down leg intermittently since last week. Denies trauma.      /87   Pulse 75   Temp 36.5 °C (97.7 °F) (Temporal)   Resp 16   Ht 1.753 m (5' 9\")   Wt 65.4 kg (144 lb 2.9 oz)   SpO2 97%   BMI 21.29 kg/m²     Pt ambulated into triage. Educated on triage process. Instructed to notify staff for any worsening symptoms.  "

## 2024-11-26 ENCOUNTER — APPOINTMENT (OUTPATIENT)
Dept: RADIOLOGY | Facility: MEDICAL CENTER | Age: 54
End: 2024-11-26
Attending: EMERGENCY MEDICINE

## 2024-11-26 ENCOUNTER — HOSPITAL ENCOUNTER (EMERGENCY)
Facility: MEDICAL CENTER | Age: 54
End: 2024-11-26
Attending: EMERGENCY MEDICINE

## 2024-11-26 VITALS
HEIGHT: 69 IN | DIASTOLIC BLOOD PRESSURE: 76 MMHG | WEIGHT: 154.76 LBS | OXYGEN SATURATION: 100 % | RESPIRATION RATE: 16 BRPM | BODY MASS INDEX: 22.92 KG/M2 | HEART RATE: 78 BPM | SYSTOLIC BLOOD PRESSURE: 111 MMHG | TEMPERATURE: 96.8 F

## 2024-11-26 DIAGNOSIS — M19.071 ARTHRITIS OF RIGHT ANKLE: ICD-10-CM

## 2024-11-26 DIAGNOSIS — S93.401A SPRAIN OF RIGHT ANKLE, UNSPECIFIED LIGAMENT, INITIAL ENCOUNTER: ICD-10-CM

## 2024-11-26 PROCEDURE — 99284 EMERGENCY DEPT VISIT MOD MDM: CPT

## 2024-11-26 PROCEDURE — 700102 HCHG RX REV CODE 250 W/ 637 OVERRIDE(OP): Performed by: EMERGENCY MEDICINE

## 2024-11-26 PROCEDURE — 73610 X-RAY EXAM OF ANKLE: CPT | Mod: RT

## 2024-11-26 PROCEDURE — A9270 NON-COVERED ITEM OR SERVICE: HCPCS | Performed by: EMERGENCY MEDICINE

## 2024-11-26 RX ORDER — HYDROCODONE BITARTRATE AND ACETAMINOPHEN 5; 325 MG/1; MG/1
1 TABLET ORAL ONCE
Status: COMPLETED | OUTPATIENT
Start: 2024-11-26 | End: 2024-11-26

## 2024-11-26 RX ORDER — HYDROCODONE BITARTRATE AND ACETAMINOPHEN 5; 325 MG/1; MG/1
1 TABLET ORAL EVERY 6 HOURS PRN
Qty: 7 TABLET | Refills: 0 | Status: SHIPPED | OUTPATIENT
Start: 2024-11-26 | End: 2024-11-29

## 2024-11-26 RX ORDER — IBUPROFEN 600 MG/1
600 TABLET, FILM COATED ORAL ONCE
Status: COMPLETED | OUTPATIENT
Start: 2024-11-26 | End: 2024-11-26

## 2024-11-26 RX ADMIN — IBUPROFEN 600 MG: 600 TABLET, FILM COATED ORAL at 18:33

## 2024-11-26 RX ADMIN — HYDROCODONE BITARTRATE AND ACETAMINOPHEN 1 TABLET: 5; 325 TABLET ORAL at 18:34

## 2024-11-26 ASSESSMENT — FIBROSIS 4 INDEX: FIB4 SCORE: 1.259886689842797995

## 2024-11-26 NOTE — Clinical Note
Tariq Rivera was seen and treated in our emergency department on 11/26/2024.  He may return to work on 11/29/2024.       If you have any questions or concerns, please don't hesitate to call.      Nessa Wilde M.D.

## 2024-11-27 NOTE — ED TRIAGE NOTES
Chief Complaint   Patient presents with    Ankle Pain     Right ankle pain last night while at work. States he came down on his ankle and twisted it. Denies fall.  No obvious deformity. 7/10 pain. +bear weight to affected extremity.       Patient ambulatory to triage for above complaint. Patient A&Ox4, GCS 15, patient speaking in full sentences. Equal and unlabored respirations. Patient educated on triage process and encouraged to notify staff if condition worsens. Patient returned to the lobby in stable condition.

## 2024-11-27 NOTE — ED PROVIDER NOTES
ED Provider Note    CHIEF COMPLAINT  Chief Complaint   Patient presents with    Ankle Pain     Right ankle pain last night while at work. States he came down on his ankle and twisted it. Denies fall.  No obvious deformity. 7/10 pain. +bear weight to affected extremity.          HPI/ROS  LIMITATION TO HISTORY   Select: : None  OUTSIDE HISTORIAN(S):  ray Rivera is a 54 y.o. male who presents to the Emergency Department chief complaint of right ankle pain.  He states that he came down on his ankle wrong and twisted it he states he has been able to bear weight on the extremity but it is really uncomfortable and swollen.  No weakness numbness or tingling.  Does have a history of a fracture and surgery to that area    PAST MEDICAL HISTORY   has a past medical history of Asthma and Hemorrhoids, external (11/11/2013).    SURGICAL HISTORY   has a past surgical history that includes arch bar application (N/A, 9/6/2015); laceration repair (N/A, 9/6/2015); and exploratory of abdomen (4/2/2023).    FAMILY HISTORY  History reviewed. No pertinent family history.    SOCIAL HISTORY  Social History     Tobacco Use    Smoking status: Every Day     Current packs/day: 0.50     Types: Cigarettes    Smokeless tobacco: Never   Vaping Use    Vaping status: Never Used   Substance and Sexual Activity    Alcohol use: Not Currently    Drug use: Not Currently     Types: Inhaled     Comment: marijuana    Sexual activity: Not on file       CURRENT MEDICATIONS  Home Medications       Reviewed by Carla Eckert R.N. (Registered Nurse) on 11/26/24 at 1753  Med List Status: Partial     Medication Last Dose Status   ondansetron (ZOFRAN ODT) 4 MG TABLET DISPERSIBLE  Active   ondansetron (ZOFRAN ODT) 4 MG TABLET DISPERSIBLE  Active                    ALLERGIES  Allergies   Allergen Reactions    Nkda [No Known Drug Allergy]        PHYSICAL EXAM  VITAL SIGNS: /76   Pulse 78   Temp 36 °C (96.8 °F) (Temporal)   Resp 16   Ht  "1.753 m (5' 9\")   Wt 70.2 kg (154 lb 12.2 oz)   SpO2 100%   BMI 22.85 kg/m²    Pulse OX: Pulse Oxygen level is within normal limit  Constitutional: Alert in no apparent distress.  Eyes: PERound. Conjunctiva normal, non-icteric.   EXT/Back both medial and lateral malleoli are swelling no midfoot swelling or tenderness DP pulse 2+ no tenderness proximal tib-fib, no open wounds posterior malleoli or tenderness of bilateral malleoli  Skin: Warm, Dry, No erythema, No rash.   Neurologic: Alert and oriented, Grossly non-focal.       RADIOLOGY/PROCEDURES   I have independently interpreted the diagnostic imaging associated with this visit and am waiting the final reading from the radiologist.   My preliminary interpretation is as follows:     X-ray ankle significant hardware in place and arthritis without evidence of fracture or dislocation    Radiologist interpretation:  DX-ANKLE 3+ VIEWS RIGHT   Final Result      No acute osseous abnormality.      Severe osteoarthritis of the tibiotalar joint          COURSE & MEDICAL DECISION MAKING    ASSESSMENT, COURSE AND PLAN  Care Narrative:     Patient is a 54-year-old male presents to the emergency department likely with an ankle sprain as he is swollen but not significantly without signs of deformity.  However he does meet [place] ankle rules and x-rays were ordered at this time he will be treated with single dose of hydrocodone and Motrin.  He has no proximal tenderness and I have low concern for [place] injury    DISPOSITION AND DISCUSSIONS    6:57 PM  I reassessed patient at bedside he is resting comfortably.  He will be prescribed an Ace wrap and crutches because having difficulty weightbearing.  We discussed RICE treatment at home pain management especially NSAIDs and a small prescription for stronger pain management.  He does have severe arthritis secondary to prior fracture and surgery.  He understands feels comfortable in home.    In prescribing controlled substances " to this patient, I certify that I have obtained and reviewed the medical history of Tariq Rivera. I have also made a good omer effort to obtain applicable records from other providers who have treated the patient and records did not demonstrate any increased risk of substance abuse that would prevent me from prescribing controlled substances.     I have conducted a physical exam and documented it. I have reviewed Mr. Rivera’s prescription history as maintained by the [place] Prescription Monitoring Program.     I have assessed the patient’s risk for abuse, dependency, and addiction using the validated Opioid Risk Tool available at https://www.mdcalc.com/jklgqa-xcfp-nnnb-ort-narcotic-abuse. 0    Given the above, I believe the benefits of controlled substance therapy outweigh the risks. The reasons for prescribing controlled substances include non-narcotic, oral analgesic alternatives have been inadequate for pain control. Accordingly, I have discussed the risk and benefits, treatment plan, and alternative therapies with the patient.       I have discussed management of the patient with the following physicians and CAROLYN's:  none    Discussion of management with other QHP or appropriate source(s): None     Escalation of care considered, and ultimately not performed:Laboratory analysis    Barriers to care at this time, including but not limited to: Patient does not have established PCP.     Decision tools and prescription drugs considered including, but not limited to: Pain Medications norco .    The patient will return for new or worsening symptoms and is stable at the time of discharge.    The patient is referred to a primary physician for blood pressure management, diabetic screening, and for all other preventative health concerns.    DISPOSITION:  Patient will be discharged home in stable condition.    FOLLOW UP:  [facility], Emergency Dept  [address]  [address]  [phone]    If symptoms worsen      OUTPATIENT MEDICATIONS:  New Prescriptions    HYDROCODONE-ACETAMINOPHEN (NORCO) 5-325 MG TAB PER TABLET    Take 1 Tablet by mouth every 6 hours as needed (severe pain) for up to 3 days.         FINAL DIAGNOSIS  1. Sprain of right ankle, unspecified ligament, initial encounter    2. Arthritis of right ankle         Electronically signed by: Nessa Wilde M.D., 11/26/2024 6:00 PM

## 2025-05-23 ENCOUNTER — HOSPITAL ENCOUNTER (EMERGENCY)
Facility: MEDICAL CENTER | Age: 55
End: 2025-05-23
Attending: EMERGENCY MEDICINE
Payer: COMMERCIAL

## 2025-05-23 ENCOUNTER — APPOINTMENT (OUTPATIENT)
Dept: RADIOLOGY | Facility: MEDICAL CENTER | Age: 55
End: 2025-05-23
Attending: EMERGENCY MEDICINE
Payer: COMMERCIAL

## 2025-05-23 VITALS
WEIGHT: 156.09 LBS | HEIGHT: 69 IN | TEMPERATURE: 98.3 F | HEART RATE: 70 BPM | DIASTOLIC BLOOD PRESSURE: 96 MMHG | OXYGEN SATURATION: 97 % | SYSTOLIC BLOOD PRESSURE: 135 MMHG | RESPIRATION RATE: 16 BRPM | BODY MASS INDEX: 23.12 KG/M2

## 2025-05-23 DIAGNOSIS — S93.491A SPRAIN OF ANTERIOR TALOFIBULAR LIGAMENT OF RIGHT ANKLE, INITIAL ENCOUNTER: Primary | ICD-10-CM

## 2025-05-23 PROCEDURE — 99283 EMERGENCY DEPT VISIT LOW MDM: CPT

## 2025-05-23 PROCEDURE — 73610 X-RAY EXAM OF ANKLE: CPT | Mod: RT

## 2025-05-23 ASSESSMENT — FIBROSIS 4 INDEX: FIB4 SCORE: 1.259886689842797995

## 2025-05-23 NOTE — LETTER
"EMPLOYEE’S CLAIM FOR COMPENSATION/ REPORT OF INITIAL TREATMENT  FORM C-4  PLEASE TYPE OR PRINT  EMPLOYEE’S CLAIM - PROVIDE ALL INFORMATION REQUESTED   First Name                    MIGUEL Potter                  Last Name  Miguel Birthdate                    1970                Sex  [x]Male Claim Number (Insurer’s Use Only)     Mailing Address  [address] [place] [address], [address] Age  54 y.o. 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 (if applicable)  walking    How did this injury or occupational disease occur? (Be specific and answer in detail. Use additional sheet if necessary)  while i was walking i tried turning around and i must of twisted it the wrong way   If you believe that you have an occupational disease, when did you first have knowledge of the disability and its relationship to your employment?  n/a Witnesses to the Accident (if applicable)  n/a      Nature of Injury or Occupational Disease  Workers' Compensation  Part(s) of Body Injured or Affected  Ankle (R) N/A N/A    I CERTIFY THAT THE " ABOVE IS TRUE AND CORRECT TO T HE BEST OF MY KNOWLEDGE AND THAT I HAVE PROVIDED THIS INFORMATION IN ORDER TO OBTAIN THE BENEFITS OF [place], OR CHAPTER [place]).  I HEREBY AUTHORIZE ANY PHYSICIAN, CHIROPRACTOR, SURGEON, PRACTITIONER OR ANY OTHER PERSON, ANY HOSPITAL, INCLUDING [facility] OR [facility], ANY  MEDICAL SERVICE ORGANIZATION, ANY INSURANCE COMPANY, OR OTHER INSTITUTION OR ORGANIZATION TO RELEASE TO EACH OTHER, ANY MEDICAL OR OTHER INFORMATION, INCLUDING BENEFITS PAID OR PAYABLE, PERTINENT TO THIS INJURY OR DISEASE, EXCEPT INFORMATION RELATIVE TO DIAGNOSIS, TREATMENT AND/OR COUNSELING FOR AIDS, PSYCHOLOGICAL CONDITIONS, ALCOHOL OR CONTROLLED SUBSTANCES, FOR WHICH I MUST GIVE SPECIFIC AUTHORIZATION.  A PHOTOSTAT OF THIS AUTHORIZATION SHALL BE VALID AS THE ORIGINAL.   Date 05/23/2025 Place   [facility] Employee’s Original or  *Electronic Signature   THIS REPORT MUST BE COMPLETED AND MAILED WITHIN 3 WORKING DAYS OF TREATMENT   Place  [facility] Name of Facility  EMERGENCY DEPARTMENT   Date 5/23/2025 Diagnosis and Description of Injury or Occupational Disease  (S93.491A) Sprain of anterior talofibular ligament of right ankle, initial encounter  (primary encounter diagnosis)  The encounter diagnosis was Sprain of anterior talofibular ligament of right ankle, initial encounter. Is there evidence that the injured employee was under the influence of alcohol and/or another controlled substance at the time of accident?  [x]No  [] Yes (if yes, please explain)   Hour 03:52 PM  No   Treatment: X-ray, compression dressing as needed Have you advised the patient to remain off work five days or more?   No  [] Yes  If yes, indicate dates: From_ _                                                      To __ _  [x] No   If no, is the injured employee capable of: [x] full duty Yes   [] modified duty      If  modified duty, specify any limitations / restrictions:   X-Ray Findings: Negative    From information given by the employee, together with medical evidence, can you directly connect this injury or occupational disease as job incurred?  [x]Yes   [] No Yes    Is additional medical care by a physician indicated? [x]Yes [] No  Yes  Comments:Repeat x-rays if pain and tenderness still there in 7 to 10 days    Do you know of any previous injury or disease contributing to this condition or occupational disease? []Yes [x] No (Explain if yes):    No   Date  5/23/2025 Print Health Care Provider’s Name  NEGRO JEFFERY I certify that the employer’s copy of  this form was delivered to the employer on:   Address  [address] [facility] INSURER'S USE ONLY       [facility] [facility] Zip   [address] Provider’s Tax ID Number  [id number] Telephone  Dept: [phone]    Health Care Provider’s Original or Electronic Signature  fabrizio-NEGRO Haji M.D.    Degree (MD,DO, DC,PA-C,APRN)  MD    ORIGINAL - TREATING HEALTHCARE PROVIDER PAGE 2 - INSURER/TPA PAGE 3 - EMPLOYER PAGE 4 - EMPLOYEE             Form C-4 (rev.02/25)

## 2025-05-23 NOTE — LETTER
"  EMPLOYEE’S CLAIM FOR COMPENSATION/ REPORT OF INITIAL TREATMENT  FORM C-4  PLEASE TYPE OR PRINT    EMPLOYEE’S CLAIM - PROVIDE ALL INFORMATION REQUESTED   First Name                    MIGUEL Potter                  Last Name  Miguel Birthdate                    1970                Sex  [x]Male Claim Number (Insurer’s Use Only)     Mailing Address  [address] Age  54 y.o. 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 (if applicable)  walking    How did this injury or occupational disease occur? (Be specific and answer in detail. Use additional sheet if necessary)  while i was walking i tried turning around and i must of twisted it the wrong way   If you believe that you have an occupational disease, when did you first have knowledge of the disability and its relationship to your employment?  n/a Witnesses to the Accident (if applicable)  n/a      Nature of Injury or Occupational Disease  Workers' Compensation  Part(s) of Body Injured or Affected  Ankle (R) N/A N/A    I CERTIFY THAT THE ABOVE IS TRUE AND CORRECT TO T HE BEST OF MY KNOWLEDGE AND THAT I HAVE PROVIDED " THIS INFORMATION IN ORDER TO OBTAIN THE BENEFITS OF [place], OR [place] 617 OF [place]).  I HEREBY AUTHORIZE ANY PHYSICIAN, CHIROPRACTOR, SURGEON, PRACTITIONER OR ANY OTHER PERSON, ANY HOSPITAL, INCLUDING [facility] OR [facility], ANY  MEDICAL SERVICE ORGANIZATION, ANY INSURANCE COMPANY, OR OTHER INSTITUTION OR ORGANIZATION TO RELEASE TO EACH OTHER, ANY MEDICAL OR OTHER INFORMATION, INCLUDING BENEFITS PAID OR PAYABLE, PERTINENT TO THIS INJURY OR DISEASE, EXCEPT INFORMATION RELATIVE TO DIAGNOSIS, TREATMENT AND/OR COUNSELING FOR AIDS, PSYCHOLOGICAL CONDITIONS, ALCOHOL OR CONTROLLED SUBSTANCES, FOR WHICH I MUST GIVE SPECIFIC AUTHORIZATION.  A PHOTOSTAT OF THIS AUTHORIZATION SHALL BE VALID AS THE ORIGINAL.     Date   Place Employee’s Original or  *Electronic Signature   THIS REPORT MUST BE COMPLETED AND MAILED WITHIN 3 WORKING DAYS OF TREATMENT   Place  [facility], EMERGENCY DEPT    Name of Facility      Date 5/23/2025 Diagnosis and Description of Injury or Occupational Disease  (S93.491A) Sprain of anterior talofibular ligament of right ankle, initial encounter  (primary encounter diagnosis)  The encounter diagnosis was Sprain of anterior talofibular ligament of right ankle, initial encounter. Is there evidence that the injured employee was under the influence of alcohol and/or another controlled substance at the time of accident?  []No  [] Yes (if yes, please explain)   Hour   No   Treatment: X-ray, compression dressing as needed    Have you advised the patient to remain off work five days or more?   No  [] Yes  If yes, indicate dates: From_ _                                                      To __ _  [] No   If no, is the injured employee capable of: [] full duty Yes   [] modified duty      If modified duty, specify any limitations / restrictions:__________________  ___  ___________________________     X-Ray Findings: Negative    From information given by the employee, together with medical evidence, can you directly connect this injury or occupational disease as job incurred?  []Yes   [] No Yes    Is additional medical care by a physician indicated? []Yes [] No  Yes  Comments:Repeat x-rays if pain and tenderness still there in 7 to 10 days    Do you know of any previous injury or disease contributing to this condition or occupational disease? []Yes [] No (Explain if yes)                          No   Date  5/23/2025 Print Health Care Provider’s Name  No name on file I certify that the employer’s copy of  this form was delivered to the employer on:   Address    INSURER'S USE ONLY                       City    State    Zip    Provider’s Tax ID Number      Telephone  Dept: [phone]    Health Care Provider’s Original or Electronic Signature      fabrizio-NEGRO Haji M.D.    Degree (MD,DO, DC,PA-C,APRN)  {Provider Degrees:[id number]}  Choose (if applicable)      ORIGINAL - TREATING HEALTHCARE PROVIDER PAGE 2 - INSURER/TPA PAGE 3 - EMPLOYER PAGE 4 - EMPLOYEE             Form C-4 (rev.02/25)

## 2025-05-23 NOTE — ED PROVIDER NOTES
ED Provider Note    CHIEF COMPLAINT  Chief Complaint   Patient presents with    Ankle Pain     Reports R ankle pain since Tuesday, states that while walking went to turn and lost balance and rolled ankle. Does endorse some swelling w/ some tenderness.      EXTERNAL RECORDS REVIEWED  Prior outpt notes ER notes from 2024 for ankle discomfort.    HPI/ROS  LIMITATION TO HISTORY   Select: : None  OUTSIDE HISTORIAN(S):  Family at bedside    Tariq Rivera is a 54 y.o. male who presents to the emergency room for evaluation of right ankle pain and discomfort.  The patient states he has had prior ankle surgery with some plates in the past, states that he was at work earlier and had rolled his ankle inward on Tuesday.  It was very swollen and angry and over the course of several days the swelling has gotten significantly better.  He was able to bear weight on it today and came in for further assessment has it is not completely improved.  Denies any numbness or tingling, denies any hip or knee discomfort.  No other injuries or reported trauma.    PAST MEDICAL HISTORY   has a past medical history of Asthma and Hemorrhoids, external (11/11/2013).    SURGICAL HISTORY   has a past surgical history that includes arch bar application (N/A, 9/6/2015); laceration repair (N/A, 9/6/2015); and exploratory of abdomen (4/2/2023).    FAMILY HISTORY  History reviewed. No pertinent family history.    SOCIAL HISTORY  Social History     Tobacco Use    Smoking status: Every Day     Current packs/day: 0.50     Types: Cigarettes    Smokeless tobacco: Never   Vaping Use    Vaping status: Never Used   Substance and Sexual Activity    Alcohol use: Not Currently    Drug use: Not Currently     Types: Inhaled     Comment: marijuana    Sexual activity: Not on file     CURRENT MEDICATIONS  Home Medications       Reviewed by Jennifer Loya R.N. (Registered Nurse) on 05/23/25 at 1606  Med List Status: Partial     Medication Last Dose Status  "  ondansetron (ZOFRAN ODT) 4 MG TABLET DISPERSIBLE  Active   ondansetron (ZOFRAN ODT) 4 MG TABLET DISPERSIBLE  Active                  Audit from Redirected Encounters    **Home medications have not yet been reviewed for this encounter**       ALLERGIES  Allergies[1]    PHYSICAL EXAM  VITAL SIGNS: /86   Pulse 91   Temp 36.6 °C (97.8 °F) (Temporal)   Resp 16   Ht 1.753 m (5' 9\")   Wt 70.8 kg (156 lb 1.4 oz)   SpO2 95%   BMI 23.05 kg/m²    Genl: M sitting in chair comfortably, speaking clearly, appears in no acute distress   Head: NC/AT   Pulmonary: Lungs are clear to auscultation bilaterally  CV:  RRR, no murmur appreciated  Abdomen: soft, NT/ND  Musculoskeletal: Pain free ROM of the neck. Moving upper and lower extremities in spontaneous and coordinated fashion  Right Lower Extremity  - Skin: Tenderness over the ATFL space, very subtle tenderness with no bruising.  No pain with palpation over the proximal fifth metatarsal.  No abrasions, lacerations or ecchymosis  - Motor: Full ROM at knee, hip, ankle; 5/5 ankle dorsal/plantar flexion, EHL/FHL intact  - Sensation intact to superficial/deep peroneal, tibial, saphenous, sural nerves  - 2+ dorsalis pedis and posterior tibialis, cap refill < 2 seconds x 5 digits  ?  EKG/LABS  none    RADIOLOGY/PROCEDURES   I have independently interpreted the diagnostic imaging associated with this visit and am waiting the final reading from the radiologist.   My preliminary interpretation is as follows: No acute bony abnormalities  Radiologist interpretation:  DX-ANKLE 3+ VIEWS RIGHT   Final Result      1.  No acute fracture or dislocation. If symptoms persist, follow-up radiographs can be obtained in 7-10 days.   2.  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        COURSE & MEDICAL DECISION MAKING    ASSESSMENT, COURSE AND PLAN  Care Narrative: Evaluated for symptoms as described above.  At this time the patient is neurovascularly intact, has pain over the ATFL " space, does appear that there is no inappropriate malalignment, is very reassuring to see no neurovascular compromise at this time I do believe basic x-rays to be obtained to the ankle.  There is no pain or tenderness in the midfoot.  X-rays came back showing no fracture or malalignment.  I would recommend rest, ice, elevation and using high wasted shoe, to use a compression wrap if needed and weightbearing as tolerated.  Routine outpatient follow-up in about 7 to 10 days with repeat x-rays if needed for pinpoint pain and discomfort as a nondisplaced fracture cannot be fully excluded at this time.  Discharged home in stable condition.    DISPOSITION AND DISCUSSIONS  I have discussed management of the patient with the following physicians and CAROLYN's:  none    Discussion of management with other QHP or appropriate source(s): None     Escalation of care considered, and ultimately not performed:IV fluids and Laboratory analysis    Barriers to care at this time, including but not limited to: none.     Decision tools and prescription drugs considered including, but not limited to: otc meds.    FINAL DIAGNOSIS  1. Sprain of anterior talofibular ligament of right ankle, initial encounter      Electronically signed by: Kodak De La Paz M.D., 5/23/2025 4:37 PM       [1]   Allergies  Allergen Reactions    Nkda [No Known Drug Allergy]

## 2025-05-23 NOTE — ED TRIAGE NOTES
"Chief Complaint   Patient presents with    Ankle Pain     Reports R ankle pain since Tuesday, states that while walking went to turn and lost balance and rolled ankle. Does endorse some swelling w/ some tenderness.      /86   Pulse 91   Temp 36.6 °C (97.8 °F) (Temporal)   Resp 16   Ht 1.753 m (5' 9\")   Wt 70.8 kg (156 lb 1.4 oz)   SpO2 95%   BMI 23.05 kg/m²     Pt ambulatory to triage w/ above complaint.   Denies any medical history.  Placed pt back in lobby, educated on NPO status.     "

## 2025-05-24 NOTE — ED NOTES
Discharged home to follow up with pcp as needed. Given instruction on ice and elevate. All questions answered.

## (undated) DEVICE — SUCTION INSTRUMENT YANKAUER BULBOUS TIP W/O VENT (50EA/CA)

## (undated) DEVICE — PACK MAJOR BASIN - (2EA/CA)

## (undated) DEVICE — SUTURE 0 SILK TIES (36PK/BX)

## (undated) DEVICE — SET EXTENSION WITH 2 PORTS (48EA/CA) ***PART #2C8610 IS A SUBSTITUTE*****

## (undated) DEVICE — COVER LIGHT HANDLE ALC PLUS DISP (18EA/BX)

## (undated) DEVICE — SUTURE 3-0 VICRYL PLUS SH - 27 INCH (36/BX)

## (undated) DEVICE — SUTURE 1 PDS II PLUS TP-1 - (12PK/BX)

## (undated) DEVICE — SUTURE 0 COATED VICRYL 6-18IN - (12PK/BX)

## (undated) DEVICE — GOWN WARMING STANDARD FLEX - (30/CA)

## (undated) DEVICE — BOVIE  BLADE 6 EXTENDED - (50/PK)

## (undated) DEVICE — SET LEADWIRE 5 LEAD BEDSIDE DISPOSABLE ECG (1SET OF 5/EA)

## (undated) DEVICE — SUTURE 3-0 VICRYL PLUS SH - 8X 18 INCH (12/BX)

## (undated) DEVICE — TOWELS CLOTH SURGICAL - (4/PK 20PK/CA)

## (undated) DEVICE — CANISTER SUCTION 3000ML MECHANICAL FILTER AUTO SHUTOFF MEDI-VAC NONSTERILE LF DISP  (40EA/CA)

## (undated) DEVICE — ELECTRODE DUAL RETURN W/ CORD - (50/PK)

## (undated) DEVICE — SLEEVE, VASO, THIGH, MED

## (undated) DEVICE — SENSOR OXIMETER ADULT SPO2 RD SET (20EA/BX)

## (undated) DEVICE — SUTURE 3-0 SILK SH C/R 18 IN - (12/BX)

## (undated) DEVICE — SUTURE 3-0 SILK SH (12PK/BX)

## (undated) DEVICE — SODIUM CHL IRRIGATION 0.9% 1000ML (12EA/CA)

## (undated) DEVICE — SUTURE 3-0 SILK SH 30 (36PK/BX)

## (undated) DEVICE — TRAY SURESTEP FOLEY TEMP SENSING 16FR (10EA/CA) ORDER  #18764 FOR TEMP FOLEY ONLY

## (undated) DEVICE — TUBING CLEARLINK DUO-VENT - C-FLO (48EA/CA)

## (undated) DEVICE — CHLORAPREP 26 ML APPLICATOR - ORANGE TINT(25/CA)

## (undated) DEVICE — DRAPE MAYO STAND - (30/CA)

## (undated) DEVICE — GLOVE BIOGEL INDICATOR SZ 6.5 SURGICAL PF LTX - (50PR/BX 4BX/CA)

## (undated) DEVICE — LACTATED RINGERS INJ 1000 ML - (14EA/CA 60CA/PF)

## (undated) DEVICE — GLOVE BIOGEL SZ 6.5 SURGICAL PF LTX (50PR/BX 4BX/CA)

## (undated) DEVICE — GLOVE BIOGEL SZ 8 SURGICAL PF LTX - (50PR/BX 4BX/CA)

## (undated) DEVICE — SPONGE GAUZESTER 4 X 4 4PLY - (128PK/CA)

## (undated) DEVICE — SUTURE GENERAL

## (undated) DEVICE — STAPLER SKIN DISP - (6/BX 10BX/CA) VISISTAT